# Patient Record
Sex: MALE | Race: WHITE | NOT HISPANIC OR LATINO | Employment: FULL TIME | URBAN - METROPOLITAN AREA
[De-identification: names, ages, dates, MRNs, and addresses within clinical notes are randomized per-mention and may not be internally consistent; named-entity substitution may affect disease eponyms.]

---

## 2020-02-24 ENCOUNTER — OFFICE VISIT (OUTPATIENT)
Dept: CARDIOLOGY | Facility: CLINIC | Age: 50
End: 2020-02-24

## 2020-02-24 VITALS
WEIGHT: 194.6 LBS | DIASTOLIC BLOOD PRESSURE: 78 MMHG | SYSTOLIC BLOOD PRESSURE: 126 MMHG | HEART RATE: 65 BPM | HEIGHT: 60 IN | BODY MASS INDEX: 38.21 KG/M2

## 2020-02-24 DIAGNOSIS — R07.89 CHEST DISCOMFORT: ICD-10-CM

## 2020-02-24 DIAGNOSIS — R06.02 SOB (SHORTNESS OF BREATH): ICD-10-CM

## 2020-02-24 DIAGNOSIS — I50.9 CONGESTIVE HEART FAILURE, UNSPECIFIED HF CHRONICITY, UNSPECIFIED HEART FAILURE TYPE (HCC): Primary | ICD-10-CM

## 2020-02-24 PROCEDURE — 99204 OFFICE O/P NEW MOD 45 MIN: CPT | Performed by: INTERNAL MEDICINE

## 2020-02-24 RX ORDER — LISINOPRIL 10 MG/1
10 TABLET ORAL DAILY
COMMUNITY
Start: 2020-02-02

## 2020-02-24 RX ORDER — LOVASTATIN 20 MG/1
20 TABLET ORAL NIGHTLY
COMMUNITY

## 2020-02-24 RX ORDER — METHADONE HYDROCHLORIDE 10 MG/5ML
130 SOLUTION ORAL DAILY
COMMUNITY

## 2020-02-24 RX ORDER — FUROSEMIDE 20 MG/1
20 TABLET ORAL DAILY
COMMUNITY
Start: 2020-02-21

## 2020-02-24 RX ORDER — IBUPROFEN 200 MG
800 TABLET ORAL EVERY 6 HOURS PRN
COMMUNITY

## 2020-03-09 ENCOUNTER — OUTSIDE FACILITY SERVICE (OUTPATIENT)
Dept: CARDIOLOGY | Facility: CLINIC | Age: 50
End: 2020-03-09

## 2020-03-09 DIAGNOSIS — R07.2 PRECORDIAL PAIN: ICD-10-CM

## 2020-03-09 DIAGNOSIS — R06.02 SHORTNESS OF BREATH: ICD-10-CM

## 2020-03-09 DIAGNOSIS — R94.39 ABNORMAL STRESS TEST: ICD-10-CM

## 2020-03-09 DIAGNOSIS — I20.9 ANGINA PECTORIS (HCC): Primary | ICD-10-CM

## 2020-03-09 PROCEDURE — 78452 HT MUSCLE IMAGE SPECT MULT: CPT | Performed by: INTERNAL MEDICINE

## 2020-03-09 PROCEDURE — 93018 CV STRESS TEST I&R ONLY: CPT | Performed by: INTERNAL MEDICINE

## 2020-03-09 PROCEDURE — 93306 TTE W/DOPPLER COMPLETE: CPT | Performed by: INTERNAL MEDICINE

## 2020-03-10 PROBLEM — R06.02 SHORTNESS OF BREATH: Status: ACTIVE | Noted: 2020-03-10

## 2020-03-10 PROBLEM — I20.9 ANGINA PECTORIS (HCC): Status: ACTIVE | Noted: 2020-03-10

## 2020-03-10 PROBLEM — R07.2 PRECORDIAL PAIN: Status: ACTIVE | Noted: 2020-03-10

## 2020-03-10 PROBLEM — R94.39 ABNORMAL STRESS TEST: Status: ACTIVE | Noted: 2020-03-10

## 2020-03-17 ENCOUNTER — HOSPITAL ENCOUNTER (OUTPATIENT)
Facility: HOSPITAL | Age: 50
Setting detail: HOSPITAL OUTPATIENT SURGERY
Discharge: HOME | End: 2020-03-17
Attending: INTERNAL MEDICINE | Admitting: INTERNAL MEDICINE

## 2020-03-17 VITALS
SYSTOLIC BLOOD PRESSURE: 101 MMHG | DIASTOLIC BLOOD PRESSURE: 67 MMHG | OXYGEN SATURATION: 96 % | HEART RATE: 61 BPM | RESPIRATION RATE: 16 BRPM | TEMPERATURE: 98.2 F

## 2020-03-17 DIAGNOSIS — R07.2 PRECORDIAL PAIN: ICD-10-CM

## 2020-03-17 DIAGNOSIS — I20.9 ANGINA PECTORIS (HCC): ICD-10-CM

## 2020-03-17 DIAGNOSIS — R06.02 SHORTNESS OF BREATH: ICD-10-CM

## 2020-03-17 DIAGNOSIS — R94.39 ABNORMAL STRESS TEST: ICD-10-CM

## 2020-03-17 LAB
ANION GAP SERPL CALCULATED.3IONS-SCNC: 11 MMOL/L (ref 5–15)
APTT PPP: 25.5 SECONDS (ref 24–31)
BASOPHILS # BLD AUTO: 0.1 10*3/MM3 (ref 0–0.2)
BASOPHILS NFR BLD AUTO: 0.8 % (ref 0–1.5)
BUN BLD-MCNC: 15 MG/DL (ref 6–20)
BUN/CREAT SERPL: 17 (ref 7–25)
CALCIUM SPEC-SCNC: 9.5 MG/DL (ref 8.6–10.5)
CHLORIDE SERPL-SCNC: 103 MMOL/L (ref 98–107)
CO2 SERPL-SCNC: 28 MMOL/L (ref 22–29)
CREAT BLD-MCNC: 0.88 MG/DL (ref 0.76–1.27)
DEPRECATED RDW RBC AUTO: 44.2 FL (ref 37–54)
EOSINOPHIL # BLD AUTO: 0.2 10*3/MM3 (ref 0–0.4)
EOSINOPHIL NFR BLD AUTO: 3 % (ref 0.3–6.2)
ERYTHROCYTE [DISTWIDTH] IN BLOOD BY AUTOMATED COUNT: 13.5 % (ref 12.3–15.4)
GFR SERPL CREATININE-BSD FRML MDRD: 91 ML/MIN/1.73
GLUCOSE BLD-MCNC: 91 MG/DL (ref 65–99)
HCT VFR BLD AUTO: 39.5 % (ref 37.5–51)
HGB BLD-MCNC: 14.3 G/DL (ref 13–17.7)
INR PPP: 0.99 (ref 0.9–1.1)
LYMPHOCYTES # BLD AUTO: 2.1 10*3/MM3 (ref 0.7–3.1)
LYMPHOCYTES NFR BLD AUTO: 25.1 % (ref 19.6–45.3)
MCH RBC QN AUTO: 34.4 PG (ref 26.6–33)
MCHC RBC AUTO-ENTMCNC: 36.2 G/DL (ref 31.5–35.7)
MCV RBC AUTO: 95.1 FL (ref 79–97)
MONOCYTES # BLD AUTO: 0.6 10*3/MM3 (ref 0.1–0.9)
MONOCYTES NFR BLD AUTO: 6.8 % (ref 5–12)
NEUTROPHILS # BLD AUTO: 5.3 10*3/MM3 (ref 1.7–7)
NEUTROPHILS NFR BLD AUTO: 64.3 % (ref 42.7–76)
NRBC BLD AUTO-RTO: 0 /100 WBC (ref 0–0.2)
PLATELET # BLD AUTO: 147 10*3/MM3 (ref 140–450)
PMV BLD AUTO: 8.9 FL (ref 6–12)
POTASSIUM BLD-SCNC: 4.2 MMOL/L (ref 3.5–5.2)
PROTHROMBIN TIME: 10.4 SECONDS (ref 9.6–11.7)
RBC # BLD AUTO: 4.15 10*6/MM3 (ref 4.14–5.8)
SODIUM BLD-SCNC: 142 MMOL/L (ref 136–145)
WBC NRBC COR # BLD: 8.2 10*3/MM3 (ref 3.4–10.8)

## 2020-03-17 PROCEDURE — C1769 GUIDE WIRE: HCPCS | Performed by: INTERNAL MEDICINE

## 2020-03-17 PROCEDURE — 25010000002 MIDAZOLAM PER 1 MG: Performed by: INTERNAL MEDICINE

## 2020-03-17 PROCEDURE — 85610 PROTHROMBIN TIME: CPT | Performed by: INTERNAL MEDICINE

## 2020-03-17 PROCEDURE — C1894 INTRO/SHEATH, NON-LASER: HCPCS | Performed by: INTERNAL MEDICINE

## 2020-03-17 PROCEDURE — 80048 BASIC METABOLIC PNL TOTAL CA: CPT | Performed by: INTERNAL MEDICINE

## 2020-03-17 PROCEDURE — 93458 L HRT ARTERY/VENTRICLE ANGIO: CPT | Performed by: INTERNAL MEDICINE

## 2020-03-17 PROCEDURE — 85025 COMPLETE CBC W/AUTO DIFF WBC: CPT | Performed by: INTERNAL MEDICINE

## 2020-03-17 PROCEDURE — 93005 ELECTROCARDIOGRAM TRACING: CPT | Performed by: INTERNAL MEDICINE

## 2020-03-17 PROCEDURE — 25010000002 FENTANYL CITRATE (PF) 100 MCG/2ML SOLUTION: Performed by: INTERNAL MEDICINE

## 2020-03-17 PROCEDURE — 0 IOPAMIDOL PER 1 ML: Performed by: INTERNAL MEDICINE

## 2020-03-17 PROCEDURE — 85730 THROMBOPLASTIN TIME PARTIAL: CPT | Performed by: INTERNAL MEDICINE

## 2020-03-17 RX ORDER — ATROPINE SULFATE 1 MG/ML
.5-1 INJECTION, SOLUTION INTRAMUSCULAR; INTRAVENOUS; SUBCUTANEOUS
Status: DISCONTINUED | OUTPATIENT
Start: 2020-03-17 | End: 2020-03-18 | Stop reason: HOSPADM

## 2020-03-17 RX ORDER — DIPHENHYDRAMINE HCL 25 MG
25 TABLET ORAL EVERY 6 HOURS PRN
Status: DISCONTINUED | OUTPATIENT
Start: 2020-03-17 | End: 2020-03-18 | Stop reason: HOSPADM

## 2020-03-17 RX ORDER — MIDAZOLAM HYDROCHLORIDE 1 MG/ML
INJECTION INTRAMUSCULAR; INTRAVENOUS AS NEEDED
Status: DISCONTINUED | OUTPATIENT
Start: 2020-03-17 | End: 2020-03-17 | Stop reason: HOSPADM

## 2020-03-17 RX ORDER — LIDOCAINE HYDROCHLORIDE 20 MG/ML
INJECTION, SOLUTION INFILTRATION; PERINEURAL AS NEEDED
Status: DISCONTINUED | OUTPATIENT
Start: 2020-03-17 | End: 2020-03-17 | Stop reason: HOSPADM

## 2020-03-17 RX ORDER — FENTANYL CITRATE 50 UG/ML
INJECTION, SOLUTION INTRAMUSCULAR; INTRAVENOUS AS NEEDED
Status: DISCONTINUED | OUTPATIENT
Start: 2020-03-17 | End: 2020-03-17 | Stop reason: HOSPADM

## 2020-03-17 RX ORDER — SODIUM CHLORIDE 9 MG/ML
250 INJECTION, SOLUTION INTRAVENOUS ONCE AS NEEDED
Status: DISCONTINUED | OUTPATIENT
Start: 2020-03-17 | End: 2020-03-18 | Stop reason: HOSPADM

## 2020-04-03 PROCEDURE — 93010 ELECTROCARDIOGRAM REPORT: CPT | Performed by: INTERNAL MEDICINE

## 2021-06-29 ENCOUNTER — HOSPITAL ENCOUNTER (EMERGENCY)
Facility: HOSPITAL | Age: 51
Discharge: HOME | End: 2021-06-29
Admitting: EMERGENCY MEDICINE

## 2021-06-29 ENCOUNTER — APPOINTMENT (OUTPATIENT)
Dept: GENERAL RADIOLOGY | Facility: HOSPITAL | Age: 51
End: 2021-06-29

## 2021-06-29 VITALS
HEART RATE: 68 BPM | OXYGEN SATURATION: 96 % | RESPIRATION RATE: 15 BRPM | BODY MASS INDEX: 28.31 KG/M2 | TEMPERATURE: 97.5 F | WEIGHT: 209 LBS | DIASTOLIC BLOOD PRESSURE: 78 MMHG | HEIGHT: 72 IN | SYSTOLIC BLOOD PRESSURE: 127 MMHG

## 2021-06-29 DIAGNOSIS — T67.5XXA HEAT EXHAUSTION, INITIAL ENCOUNTER: Primary | ICD-10-CM

## 2021-06-29 LAB
ALBUMIN SERPL-MCNC: 4.5 G/DL (ref 3.5–5.2)
ALBUMIN/GLOB SERPL: 1.6 G/DL
ALP SERPL-CCNC: 108 U/L (ref 39–117)
ALT SERPL W P-5'-P-CCNC: 14 U/L (ref 1–41)
ANION GAP SERPL CALCULATED.3IONS-SCNC: 15 MMOL/L (ref 5–15)
AST SERPL-CCNC: 23 U/L (ref 1–40)
BASOPHILS # BLD AUTO: 0.1 10*3/MM3 (ref 0–0.2)
BASOPHILS NFR BLD AUTO: 1 % (ref 0–1.5)
BILIRUB SERPL-MCNC: 0.4 MG/DL (ref 0–1.2)
BUN SERPL-MCNC: 15 MG/DL (ref 6–20)
BUN/CREAT SERPL: 15.5 (ref 7–25)
CALCIUM SPEC-SCNC: 9.5 MG/DL (ref 8.6–10.5)
CHLORIDE SERPL-SCNC: 102 MMOL/L (ref 98–107)
CK MB SERPL-CCNC: 2.49 NG/ML
CK SERPL-CCNC: 110 U/L (ref 20–200)
CO2 SERPL-SCNC: 24 MMOL/L (ref 22–29)
CREAT SERPL-MCNC: 0.97 MG/DL (ref 0.76–1.27)
DEPRECATED RDW RBC AUTO: 44.6 FL (ref 37–54)
EOSINOPHIL # BLD AUTO: 0.2 10*3/MM3 (ref 0–0.4)
EOSINOPHIL NFR BLD AUTO: 2.7 % (ref 0.3–6.2)
ERYTHROCYTE [DISTWIDTH] IN BLOOD BY AUTOMATED COUNT: 13.8 % (ref 12.3–15.4)
GFR SERPL CREATININE-BSD FRML MDRD: 81 ML/MIN/1.73
GLOBULIN UR ELPH-MCNC: 2.8 GM/DL
GLUCOSE SERPL-MCNC: 119 MG/DL (ref 65–99)
HCT VFR BLD AUTO: 41.1 % (ref 37.5–51)
HGB BLD-MCNC: 14.6 G/DL (ref 13–17.7)
HOLD SPECIMEN: NORMAL
LYMPHOCYTES # BLD AUTO: 1.5 10*3/MM3 (ref 0.7–3.1)
LYMPHOCYTES NFR BLD AUTO: 22.6 % (ref 19.6–45.3)
MCH RBC QN AUTO: 33.5 PG (ref 26.6–33)
MCHC RBC AUTO-ENTMCNC: 35.5 G/DL (ref 31.5–35.7)
MCV RBC AUTO: 94.4 FL (ref 79–97)
MONOCYTES # BLD AUTO: 0.4 10*3/MM3 (ref 0.1–0.9)
MONOCYTES NFR BLD AUTO: 6.3 % (ref 5–12)
NEUTROPHILS NFR BLD AUTO: 4.4 10*3/MM3 (ref 1.7–7)
NEUTROPHILS NFR BLD AUTO: 67.4 % (ref 42.7–76)
NRBC BLD AUTO-RTO: 0 /100 WBC (ref 0–0.2)
PLATELET # BLD AUTO: 170 10*3/MM3 (ref 140–450)
PMV BLD AUTO: 9.3 FL (ref 6–12)
POTASSIUM SERPL-SCNC: 4.4 MMOL/L (ref 3.5–5.2)
PROT SERPL-MCNC: 7.3 G/DL (ref 6–8.5)
RBC # BLD AUTO: 4.35 10*6/MM3 (ref 4.14–5.8)
SODIUM SERPL-SCNC: 141 MMOL/L (ref 136–145)
TROPONIN T SERPL-MCNC: <0.01 NG/ML (ref 0–0.03)
WBC # BLD AUTO: 6.6 10*3/MM3 (ref 3.4–10.8)

## 2021-06-29 PROCEDURE — 85025 COMPLETE CBC W/AUTO DIFF WBC: CPT | Performed by: NURSE PRACTITIONER

## 2021-06-29 PROCEDURE — 71045 X-RAY EXAM CHEST 1 VIEW: CPT

## 2021-06-29 PROCEDURE — 99283 EMERGENCY DEPT VISIT LOW MDM: CPT

## 2021-06-29 PROCEDURE — 80053 COMPREHEN METABOLIC PANEL: CPT | Performed by: NURSE PRACTITIONER

## 2021-06-29 PROCEDURE — 82553 CREATINE MB FRACTION: CPT | Performed by: NURSE PRACTITIONER

## 2021-06-29 PROCEDURE — 93005 ELECTROCARDIOGRAM TRACING: CPT

## 2021-06-29 PROCEDURE — 84484 ASSAY OF TROPONIN QUANT: CPT | Performed by: NURSE PRACTITIONER

## 2021-06-29 PROCEDURE — 82550 ASSAY OF CK (CPK): CPT | Performed by: NURSE PRACTITIONER

## 2021-06-29 RX ORDER — SODIUM CHLORIDE 0.9 % (FLUSH) 0.9 %
10 SYRINGE (ML) INJECTION AS NEEDED
Status: DISCONTINUED | OUTPATIENT
Start: 2021-06-29 | End: 2021-06-29 | Stop reason: HOSPADM

## 2021-06-29 RX ADMIN — SODIUM CHLORIDE 1000 ML: 9 INJECTION, SOLUTION INTRAVENOUS at 18:09

## 2021-07-01 LAB — QT INTERVAL: 488 MS

## 2025-05-29 ENCOUNTER — APPOINTMENT (OUTPATIENT)
Dept: MRI IMAGING | Facility: HOSPITAL | Age: 55
End: 2025-05-29
Payer: OTHER GOVERNMENT

## 2025-05-29 ENCOUNTER — HOSPITAL ENCOUNTER (INPATIENT)
Facility: HOSPITAL | Age: 55
LOS: 4 days | Discharge: NURSING, REHAB OR LONG-TERM CARE | End: 2025-06-02
Attending: STUDENT IN AN ORGANIZED HEALTH CARE EDUCATION/TRAINING PROGRAM | Admitting: STUDENT IN AN ORGANIZED HEALTH CARE EDUCATION/TRAINING PROGRAM
Payer: MEDICAID

## 2025-05-29 ENCOUNTER — APPOINTMENT (OUTPATIENT)
Dept: CARDIOLOGY | Facility: HOSPITAL | Age: 55
End: 2025-05-29
Payer: MEDICAID

## 2025-05-29 ENCOUNTER — APPOINTMENT (OUTPATIENT)
Dept: CT IMAGING | Facility: HOSPITAL | Age: 55
End: 2025-05-29
Payer: OTHER GOVERNMENT

## 2025-05-29 DIAGNOSIS — I63.411 CEREBROVASCULAR ACCIDENT (CVA) DUE TO EMBOLISM OF RIGHT MIDDLE CEREBRAL ARTERY: Primary | ICD-10-CM

## 2025-05-29 PROBLEM — I63.9 STROKE: Status: ACTIVE | Noted: 2025-05-29

## 2025-05-29 LAB
ALBUMIN SERPL-MCNC: 3.9 G/DL (ref 3.5–5.2)
ALP SERPL-CCNC: 108 U/L (ref 39–117)
ALT SERPL W P-5'-P-CCNC: 25 U/L (ref 1–41)
ANION GAP SERPL CALCULATED.3IONS-SCNC: 19.1 MMOL/L (ref 5–15)
AORTIC DIMENSIONLESS INDEX: 0.62 (DI)
AST SERPL-CCNC: 39 U/L (ref 1–40)
AV MEAN PRESS GRAD SYS DOP V1V2: 6 MMHG
AV VMAX SYS DOP: 163 CM/SEC
BH CV ECHO MEAS - ACS: 1.8 CM
BH CV ECHO MEAS - AO MAX PG: 10.6 MMHG
BH CV ECHO MEAS - AO V2 VTI: 30.1 CM
BH CV ECHO MEAS - AVA(I,D): 2.6 CM2
BH CV ECHO MEAS - EDV(CUBED): 110.6 ML
BH CV ECHO MEAS - EDV(MOD-SP4): 94.8 ML
BH CV ECHO MEAS - EF(MOD-SP4): 42.1 %
BH CV ECHO MEAS - ESV(CUBED): 50.7 ML
BH CV ECHO MEAS - ESV(MOD-SP4): 54.9 ML
BH CV ECHO MEAS - FS: 22.9 %
BH CV ECHO MEAS - IVS/LVPW: 1 CM
BH CV ECHO MEAS - IVSD: 1.1 CM
BH CV ECHO MEAS - LA DIMENSION: 3.1 CM
BH CV ECHO MEAS - LAT PEAK E' VEL: 8.4 CM/SEC
BH CV ECHO MEAS - LV MASS(C)D: 194 GRAMS
BH CV ECHO MEAS - LV MAX PG: 2.8 MMHG
BH CV ECHO MEAS - LV MEAN PG: 2 MMHG
BH CV ECHO MEAS - LV V1 MAX: 83.4 CM/SEC
BH CV ECHO MEAS - LV V1 VTI: 18.7 CM
BH CV ECHO MEAS - LVIDD: 4.8 CM
BH CV ECHO MEAS - LVIDS: 3.7 CM
BH CV ECHO MEAS - LVOT AREA: 4.2 CM2
BH CV ECHO MEAS - LVOT DIAM: 2.3 CM
BH CV ECHO MEAS - LVPWD: 1.1 CM
BH CV ECHO MEAS - MED PEAK E' VEL: 5.7 CM/SEC
BH CV ECHO MEAS - MV A MAX VEL: 116 CM/SEC
BH CV ECHO MEAS - MV DEC SLOPE: 392.5 CM/SEC2
BH CV ECHO MEAS - MV DEC TIME: 0.29 SEC
BH CV ECHO MEAS - MV E MAX VEL: 87.5 CM/SEC
BH CV ECHO MEAS - MV E/A: 0.75
BH CV ECHO MEAS - MV MAX PG: 5.3 MMHG
BH CV ECHO MEAS - MV MEAN PG: 3 MMHG
BH CV ECHO MEAS - MV P1/2T: 74.5 MSEC
BH CV ECHO MEAS - MV V2 VTI: 20.2 CM
BH CV ECHO MEAS - MVA(P1/2T): 3 CM2
BH CV ECHO MEAS - MVA(VTI): 3.8 CM2
BH CV ECHO MEAS - PA V2 MAX: 88.6 CM/SEC
BH CV ECHO MEAS - QP/QS: 0.57
BH CV ECHO MEAS - RV MAX PG: 1.36 MMHG
BH CV ECHO MEAS - RV V1 MAX: 58.3 CM/SEC
BH CV ECHO MEAS - RV V1 VTI: 14.2 CM
BH CV ECHO MEAS - RVDD: 3 CM
BH CV ECHO MEAS - RVOT DIAM: 2 CM
BH CV ECHO MEAS - SV(LVOT): 77.7 ML
BH CV ECHO MEAS - SV(MOD-SP4): 39.9 ML
BH CV ECHO MEAS - SV(RVOT): 44.6 ML
BH CV ECHO MEAS - TAPSE (>1.6): 2.9 CM
BH CV ECHO MEASUREMENTS AVERAGE E/E' RATIO: 12.41
BH CV XLRA - TDI S': 12.7 CM/SEC
BH CV XLRA MEAS LEFT CAROTID BULB EDV: -36.4 CM/SEC
BH CV XLRA MEAS LEFT CAROTID BULB PSV: -80.6 CM/SEC
BH CV XLRA MEAS LEFT DIST CCA EDV: -29.8 CM/SEC
BH CV XLRA MEAS LEFT DIST CCA PSV: -78.3 CM/SEC
BH CV XLRA MEAS LEFT DIST ICA EDV: -46.9 CM/SEC
BH CV XLRA MEAS LEFT DIST ICA PSV: -93.9 CM/SEC
BH CV XLRA MEAS LEFT ICA/CCA RATIO: 0.79
BH CV XLRA MEAS LEFT PROX CCA EDV: 37.8 CM/SEC
BH CV XLRA MEAS LEFT PROX CCA PSV: 119 CM/SEC
BH CV XLRA MEAS LEFT PROX ECA PSV: -122 CM/SEC
BH CV XLRA MEAS LEFT PROX ICA EDV: -30.1 CM/SEC
BH CV XLRA MEAS LEFT PROX ICA PSV: -79.2 CM/SEC
BH CV XLRA MEAS LEFT PROX SCLA PSV: 163 CM/SEC
BH CV XLRA MEAS LEFT VERTEBRAL A EDV: -21.7 CM/SEC
BH CV XLRA MEAS LEFT VERTEBRAL A PSV: -63.1 CM/SEC
BH CV XLRA MEAS RIGHT CAROTID BULB EDV: -18 CM/SEC
BH CV XLRA MEAS RIGHT CAROTID BULB PSV: -60.9 CM/SEC
BH CV XLRA MEAS RIGHT DIST CCA EDV: 20.5 CM/SEC
BH CV XLRA MEAS RIGHT DIST CCA PSV: 67.1 CM/SEC
BH CV XLRA MEAS RIGHT DIST ICA EDV: -25.6 CM/SEC
BH CV XLRA MEAS RIGHT DIST ICA PSV: -61.3 CM/SEC
BH CV XLRA MEAS RIGHT ICA/CCA RATIO: 0.7
BH CV XLRA MEAS RIGHT PROX CCA EDV: 22.4 CM/SEC
BH CV XLRA MEAS RIGHT PROX CCA PSV: 88.2 CM/SEC
BH CV XLRA MEAS RIGHT PROX ECA PSV: -168 CM/SEC
BH CV XLRA MEAS RIGHT PROX ICA EDV: -19.6 CM/SEC
BH CV XLRA MEAS RIGHT PROX ICA PSV: -51.1 CM/SEC
BH CV XLRA MEAS RIGHT PROX SCLA PSV: 177 CM/SEC
BH CV XLRA MEAS RIGHT VERTEBRAL A EDV: 18.1 CM/SEC
BH CV XLRA MEAS RIGHT VERTEBRAL A PSV: 79 CM/SEC
BILIRUB CONJ SERPL-MCNC: 0.3 MG/DL (ref 0–0.3)
BILIRUB INDIRECT SERPL-MCNC: 0.6 MG/DL
BILIRUB SERPL-MCNC: 0.9 MG/DL (ref 0–1.2)
BUN SERPL-MCNC: 6.1 MG/DL (ref 6–20)
BUN/CREAT SERPL: 9.4 (ref 7–25)
CALCIUM SPEC-SCNC: 8.9 MG/DL (ref 8.6–10.5)
CHLORIDE SERPL-SCNC: 99 MMOL/L (ref 98–107)
CHOLEST SERPL-MCNC: 194 MG/DL (ref 0–200)
CO2 SERPL-SCNC: 23.9 MMOL/L (ref 22–29)
CREAT SERPL-MCNC: 0.65 MG/DL (ref 0.76–1.27)
EGFRCR SERPLBLD CKD-EPI 2021: 110.6 ML/MIN/1.73
ERYTHROCYTE [SEDIMENTATION RATE] IN BLOOD: <1 MM/HR (ref 0–20)
GEN 5 1HR TROPONIN T REFLEX: 168 NG/L
GLUCOSE BLDC GLUCOMTR-MCNC: 125 MG/DL (ref 70–105)
GLUCOSE BLDC GLUCOMTR-MCNC: 130 MG/DL (ref 70–105)
GLUCOSE BLDC GLUCOMTR-MCNC: 142 MG/DL (ref 70–105)
GLUCOSE SERPL-MCNC: 93 MG/DL (ref 65–99)
HBA1C MFR BLD: 5.29 % (ref 4.8–5.6)
HDLC SERPL-MCNC: 53 MG/DL (ref 40–60)
HOLD SPECIMEN: NORMAL
LDLC SERPL CALC-MCNC: 122 MG/DL (ref 0–100)
LDLC/HDLC SERPL: 2.26 {RATIO}
POTASSIUM SERPL-SCNC: 3.4 MMOL/L (ref 3.5–5.2)
POTASSIUM SERPL-SCNC: 3.6 MMOL/L (ref 3.5–5.2)
PROT SERPL-MCNC: 6 G/DL (ref 6–8.5)
SINUS: 4.1 CM
SODIUM SERPL-SCNC: 142 MMOL/L (ref 136–145)
STJ: 2.8 CM
TRIGL SERPL-MCNC: 107 MG/DL (ref 0–150)
TROPONIN T % DELTA: 3
TROPONIN T NUMERIC DELTA: 5 NG/L
TROPONIN T SERPL HS-MCNC: 163 NG/L
TSH SERPL DL<=0.05 MIU/L-ACNC: 3.08 UIU/ML (ref 0.27–4.2)
VIT B12 BLD-MCNC: <150 PG/ML (ref 211–946)
VLDLC SERPL-MCNC: 19 MG/DL (ref 5–40)

## 2025-05-29 PROCEDURE — 82948 REAGENT STRIP/BLOOD GLUCOSE: CPT

## 2025-05-29 PROCEDURE — 93306 TTE W/DOPPLER COMPLETE: CPT

## 2025-05-29 PROCEDURE — 84484 ASSAY OF TROPONIN QUANT: CPT

## 2025-05-29 PROCEDURE — 80048 BASIC METABOLIC PNL TOTAL CA: CPT

## 2025-05-29 PROCEDURE — 93880 EXTRACRANIAL BILAT STUDY: CPT

## 2025-05-29 PROCEDURE — 70551 MRI BRAIN STEM W/O DYE: CPT

## 2025-05-29 PROCEDURE — 25010000002 FOLIC ACID 5 MG/ML SOLUTION 10 ML VIAL

## 2025-05-29 PROCEDURE — 92523 SPEECH SOUND LANG COMPREHEN: CPT

## 2025-05-29 PROCEDURE — 83036 HEMOGLOBIN GLYCOSYLATED A1C: CPT

## 2025-05-29 PROCEDURE — 97166 OT EVAL MOD COMPLEX 45 MIN: CPT

## 2025-05-29 PROCEDURE — 93880 EXTRACRANIAL BILAT STUDY: CPT | Performed by: SURGERY

## 2025-05-29 PROCEDURE — 80076 HEPATIC FUNCTION PANEL: CPT | Performed by: NURSE PRACTITIONER

## 2025-05-29 PROCEDURE — 25010000002 HYDRALAZINE PER 20 MG

## 2025-05-29 PROCEDURE — 97162 PT EVAL MOD COMPLEX 30 MIN: CPT

## 2025-05-29 PROCEDURE — 84443 ASSAY THYROID STIM HORMONE: CPT

## 2025-05-29 PROCEDURE — 25510000001 IOPAMIDOL PER 1 ML

## 2025-05-29 PROCEDURE — 82607 VITAMIN B-12: CPT | Performed by: NURSE PRACTITIONER

## 2025-05-29 PROCEDURE — 80061 LIPID PANEL: CPT

## 2025-05-29 PROCEDURE — 70553 MRI BRAIN STEM W/O & W/DYE: CPT

## 2025-05-29 PROCEDURE — 25010000002 THIAMINE PER 100 MG

## 2025-05-29 PROCEDURE — 93306 TTE W/DOPPLER COMPLETE: CPT | Performed by: INTERNAL MEDICINE

## 2025-05-29 PROCEDURE — 85652 RBC SED RATE AUTOMATED: CPT

## 2025-05-29 PROCEDURE — 25010000002 DIAZEPAM PER 5 MG

## 2025-05-29 PROCEDURE — 99222 1ST HOSP IP/OBS MODERATE 55: CPT | Performed by: INTERNAL MEDICINE

## 2025-05-29 PROCEDURE — 71260 CT THORAX DX C+: CPT

## 2025-05-29 PROCEDURE — 99222 1ST HOSP IP/OBS MODERATE 55: CPT | Performed by: NURSE PRACTITIONER

## 2025-05-29 PROCEDURE — 84132 ASSAY OF SERUM POTASSIUM: CPT

## 2025-05-29 PROCEDURE — 74177 CT ABD & PELVIS W/CONTRAST: CPT

## 2025-05-29 RX ORDER — POTASSIUM CHLORIDE 1500 MG/1
40 TABLET, EXTENDED RELEASE ORAL EVERY 4 HOURS
Status: COMPLETED | OUTPATIENT
Start: 2025-05-29 | End: 2025-05-29

## 2025-05-29 RX ORDER — BISACODYL 5 MG/1
5 TABLET, DELAYED RELEASE ORAL DAILY PRN
Status: DISCONTINUED | OUTPATIENT
Start: 2025-05-29 | End: 2025-06-02 | Stop reason: HOSPADM

## 2025-05-29 RX ORDER — ATORVASTATIN CALCIUM 40 MG/1
80 TABLET, FILM COATED ORAL NIGHTLY
Status: DISCONTINUED | OUTPATIENT
Start: 2025-05-29 | End: 2025-06-02 | Stop reason: HOSPADM

## 2025-05-29 RX ORDER — DIAZEPAM 5 MG/1
10 TABLET ORAL
Status: DISCONTINUED | OUTPATIENT
Start: 2025-05-29 | End: 2025-06-02 | Stop reason: HOSPADM

## 2025-05-29 RX ORDER — NICOTINE 21 MG/24HR
1 PATCH, TRANSDERMAL 24 HOURS TRANSDERMAL
Status: DISCONTINUED | OUTPATIENT
Start: 2025-05-29 | End: 2025-06-02 | Stop reason: HOSPADM

## 2025-05-29 RX ORDER — ONDANSETRON 2 MG/ML
4 INJECTION INTRAMUSCULAR; INTRAVENOUS EVERY 6 HOURS PRN
Status: DISCONTINUED | OUTPATIENT
Start: 2025-05-29 | End: 2025-06-02 | Stop reason: HOSPADM

## 2025-05-29 RX ORDER — ACETAMINOPHEN 160 MG/5ML
650 SOLUTION ORAL EVERY 4 HOURS PRN
Status: DISCONTINUED | OUTPATIENT
Start: 2025-05-29 | End: 2025-06-02 | Stop reason: HOSPADM

## 2025-05-29 RX ORDER — POLYETHYLENE GLYCOL 3350 17 G/17G
17 POWDER, FOR SOLUTION ORAL DAILY PRN
Status: DISCONTINUED | OUTPATIENT
Start: 2025-05-29 | End: 2025-06-02 | Stop reason: HOSPADM

## 2025-05-29 RX ORDER — IOPAMIDOL 755 MG/ML
100 INJECTION, SOLUTION INTRAVASCULAR
Status: COMPLETED | OUTPATIENT
Start: 2025-05-29 | End: 2025-05-29

## 2025-05-29 RX ORDER — ASPIRIN 300 MG/1
300 SUPPOSITORY RECTAL DAILY
Status: DISCONTINUED | OUTPATIENT
Start: 2025-05-29 | End: 2025-06-02 | Stop reason: HOSPADM

## 2025-05-29 RX ORDER — HYDRALAZINE HYDROCHLORIDE 20 MG/ML
10 INJECTION INTRAMUSCULAR; INTRAVENOUS EVERY 4 HOURS PRN
Status: DISCONTINUED | OUTPATIENT
Start: 2025-05-29 | End: 2025-06-02 | Stop reason: HOSPADM

## 2025-05-29 RX ORDER — CLOPIDOGREL BISULFATE 75 MG/1
75 TABLET ORAL DAILY
Status: DISCONTINUED | OUTPATIENT
Start: 2025-05-29 | End: 2025-06-02 | Stop reason: HOSPADM

## 2025-05-29 RX ORDER — THIAMINE HYDROCHLORIDE 100 MG/ML
200 INJECTION, SOLUTION INTRAMUSCULAR; INTRAVENOUS EVERY 8 HOURS SCHEDULED
Status: DISCONTINUED | OUTPATIENT
Start: 2025-05-29 | End: 2025-06-02 | Stop reason: HOSPADM

## 2025-05-29 RX ORDER — AMOXICILLIN 250 MG
2 CAPSULE ORAL 2 TIMES DAILY PRN
Status: DISCONTINUED | OUTPATIENT
Start: 2025-05-29 | End: 2025-06-02 | Stop reason: HOSPADM

## 2025-05-29 RX ORDER — NITROGLYCERIN 0.4 MG/1
0.4 TABLET SUBLINGUAL
Status: DISCONTINUED | OUTPATIENT
Start: 2025-05-29 | End: 2025-06-02 | Stop reason: HOSPADM

## 2025-05-29 RX ORDER — ASPIRIN 325 MG
325 TABLET ORAL DAILY
Status: DISCONTINUED | OUTPATIENT
Start: 2025-05-29 | End: 2025-06-02 | Stop reason: HOSPADM

## 2025-05-29 RX ORDER — AMLODIPINE BESYLATE 5 MG/1
5 TABLET ORAL
Status: DISCONTINUED | OUTPATIENT
Start: 2025-05-30 | End: 2025-06-02 | Stop reason: HOSPADM

## 2025-05-29 RX ORDER — DIAZEPAM 10 MG/2ML
10 INJECTION, SOLUTION INTRAMUSCULAR; INTRAVENOUS
Status: DISCONTINUED | OUTPATIENT
Start: 2025-05-29 | End: 2025-06-02 | Stop reason: HOSPADM

## 2025-05-29 RX ORDER — BISACODYL 10 MG
10 SUPPOSITORY, RECTAL RECTAL DAILY PRN
Status: DISCONTINUED | OUTPATIENT
Start: 2025-05-29 | End: 2025-06-02 | Stop reason: HOSPADM

## 2025-05-29 RX ORDER — DIAZEPAM 10 MG/2ML
20 INJECTION, SOLUTION INTRAMUSCULAR; INTRAVENOUS
Status: DISCONTINUED | OUTPATIENT
Start: 2025-05-29 | End: 2025-06-02 | Stop reason: HOSPADM

## 2025-05-29 RX ORDER — ACETAMINOPHEN 650 MG/1
650 SUPPOSITORY RECTAL EVERY 4 HOURS PRN
Status: DISCONTINUED | OUTPATIENT
Start: 2025-05-29 | End: 2025-06-02 | Stop reason: HOSPADM

## 2025-05-29 RX ORDER — DIAZEPAM 10 MG/2ML
15 INJECTION, SOLUTION INTRAMUSCULAR; INTRAVENOUS
Status: DISCONTINUED | OUTPATIENT
Start: 2025-05-29 | End: 2025-06-02 | Stop reason: HOSPADM

## 2025-05-29 RX ORDER — ACETAMINOPHEN 325 MG/1
650 TABLET ORAL EVERY 4 HOURS PRN
Status: DISCONTINUED | OUTPATIENT
Start: 2025-05-29 | End: 2025-06-02 | Stop reason: HOSPADM

## 2025-05-29 RX ORDER — DIAZEPAM 5 MG/1
20 TABLET ORAL
Status: DISCONTINUED | OUTPATIENT
Start: 2025-05-29 | End: 2025-06-02 | Stop reason: HOSPADM

## 2025-05-29 RX ORDER — DIAZEPAM 5 MG/1
15 TABLET ORAL
Status: DISCONTINUED | OUTPATIENT
Start: 2025-05-29 | End: 2025-06-02 | Stop reason: HOSPADM

## 2025-05-29 RX ADMIN — ACETAMINOPHEN 650 MG: 325 TABLET, FILM COATED ORAL at 02:02

## 2025-05-29 RX ADMIN — ACETAMINOPHEN 650 MG: 325 TABLET, FILM COATED ORAL at 14:10

## 2025-05-29 RX ADMIN — DIAZEPAM 10 MG: 5 TABLET ORAL at 20:24

## 2025-05-29 RX ADMIN — IOPAMIDOL 100 ML: 755 INJECTION, SOLUTION INTRAVENOUS at 13:08

## 2025-05-29 RX ADMIN — THIAMINE HYDROCHLORIDE 200 MG: 100 INJECTION, SOLUTION INTRAMUSCULAR; INTRAVENOUS at 13:49

## 2025-05-29 RX ADMIN — DIAZEPAM 10 MG: 10 INJECTION, SOLUTION INTRAMUSCULAR; INTRAVENOUS at 02:03

## 2025-05-29 RX ADMIN — DIAZEPAM 15 MG: 5 TABLET ORAL at 16:44

## 2025-05-29 RX ADMIN — DIAZEPAM 10 MG: 5 TABLET ORAL at 10:21

## 2025-05-29 RX ADMIN — THIAMINE HYDROCHLORIDE 200 MG: 100 INJECTION, SOLUTION INTRAMUSCULAR; INTRAVENOUS at 07:17

## 2025-05-29 RX ADMIN — ASPIRIN 325 MG ORAL TABLET 325 MG: 325 PILL ORAL at 08:20

## 2025-05-29 RX ADMIN — ATORVASTATIN CALCIUM 80 MG: 40 TABLET, FILM COATED ORAL at 20:04

## 2025-05-29 RX ADMIN — THIAMINE HYDROCHLORIDE 200 MG: 100 INJECTION, SOLUTION INTRAMUSCULAR; INTRAVENOUS at 22:27

## 2025-05-29 RX ADMIN — NICOTINE 1 PATCH: 21 PATCH TRANSDERMAL at 07:18

## 2025-05-29 RX ADMIN — POTASSIUM CHLORIDE 40 MEQ: 1500 TABLET, EXTENDED RELEASE ORAL at 08:23

## 2025-05-29 RX ADMIN — HYDRALAZINE HYDROCHLORIDE 10 MG: 20 INJECTION INTRAMUSCULAR; INTRAVENOUS at 01:33

## 2025-05-29 RX ADMIN — POTASSIUM CHLORIDE 40 MEQ: 1500 TABLET, EXTENDED RELEASE ORAL at 04:13

## 2025-05-29 RX ADMIN — Medication 5 MG: at 22:43

## 2025-05-29 RX ADMIN — DIAZEPAM 10 MG: 5 TABLET ORAL at 14:10

## 2025-05-29 RX ADMIN — POTASSIUM CHLORIDE 40 MEQ: 1500 TABLET, EXTENDED RELEASE ORAL at 15:16

## 2025-05-29 RX ADMIN — POTASSIUM CHLORIDE 40 MEQ: 1500 TABLET, EXTENDED RELEASE ORAL at 20:04

## 2025-05-29 RX ADMIN — Medication 5 MG: at 04:13

## 2025-05-29 RX ADMIN — CLOPIDOGREL BISULFATE 75 MG: 75 TABLET, FILM COATED ORAL at 10:21

## 2025-05-29 RX ADMIN — HYDRALAZINE HYDROCHLORIDE 10 MG: 20 INJECTION INTRAMUSCULAR; INTRAVENOUS at 08:23

## 2025-05-29 RX ADMIN — DIAZEPAM 15 MG: 10 INJECTION, SOLUTION INTRAMUSCULAR; INTRAVENOUS at 22:27

## 2025-05-29 RX ADMIN — FOLIC ACID 1 MG: 5 INJECTION, SOLUTION INTRAMUSCULAR; INTRAVENOUS; SUBCUTANEOUS at 08:23

## 2025-05-29 NOTE — PLAN OF CARE
Problem: Adult Inpatient Plan of Care  Goal: Plan of Care Review  Outcome: Progressing  Flowsheets (Taken 5/29/2025 0357)  Plan of Care Reviewed With: patient  Goal: Patient-Specific Goal (Individualized)  Outcome: Progressing  Goal: Absence of Hospital-Acquired Illness or Injury  Outcome: Progressing  Intervention: Identify and Manage Fall Risk  Recent Flowsheet Documentation  Taken 5/29/2025 0356 by Elizabeth Braxton RN  Safety Promotion/Fall Prevention:   assistive device/personal items within reach   clutter free environment maintained   fall prevention program maintained   lighting adjusted   mobility aid in reach   nonskid shoes/slippers when out of bed   room organization consistent   safety round/check completed  Taken 5/29/2025 0200 by Elizabeth Braxton RN  Safety Promotion/Fall Prevention:   assistive device/personal items within reach   clutter free environment maintained   fall prevention program maintained   lighting adjusted   mobility aid in reach   nonskid shoes/slippers when out of bed   room organization consistent   safety round/check completed  Intervention: Prevent Skin Injury  Recent Flowsheet Documentation  Taken 5/29/2025 0300 by Elizabeth Braxton RN  Skin Protection: transparent dressing maintained  Intervention: Prevent and Manage VTE (Venous Thromboembolism) Risk  Recent Flowsheet Documentation  Taken 5/29/2025 0300 by Elizabeth Braxton RN  VTE Prevention/Management:   SCDs (sequential compression devices) off   patient refused intervention  Intervention: Prevent Infection  Recent Flowsheet Documentation  Taken 5/29/2025 0356 by Elizabeth Braxton RN  Infection Prevention:   equipment surfaces disinfected   hand hygiene promoted   personal protective equipment utilized   rest/sleep promoted   single patient room provided  Taken 5/29/2025 0200 by Elizabeth Braxton RN  Infection Prevention:   equipment surfaces disinfected   hand hygiene promoted   personal protective equipment utilized   rest/sleep  promoted   single patient room provided  Goal: Optimal Comfort and Wellbeing  Outcome: Progressing  Intervention: Monitor Pain and Promote Comfort  Recent Flowsheet Documentation  Taken 5/29/2025 0202 by Elizabeth Braxton RN  Pain Management Interventions: pain medication given  Intervention: Provide Person-Centered Care  Recent Flowsheet Documentation  Taken 5/29/2025 0300 by Elizabeth Braxton RN  Trust Relationship/Rapport: care explained  Goal: Readiness for Transition of Care  Outcome: Progressing  Intervention: Mutually Develop Transition Plan  Recent Flowsheet Documentation  Taken 5/29/2025 0147 by Elizabeth Braxton RN  Transportation Anticipated: family or friend will provide  Patient/Family Anticipated Services at Transition:   Patient/Family Anticipates Transition to: home with family  Taken 5/29/2025 0139 by Elizabeth Braxton RN  Equipment Currently Used at Home: none     Problem: Comorbidity Management  Goal: Blood Pressure in Desired Range  Outcome: Progressing  Intervention: Maintain Blood Pressure Management  Recent Flowsheet Documentation  Taken 5/29/2025 0356 by Elizabeth Braxton RN  Medication Review/Management: medications reviewed  Taken 5/29/2025 0200 by Elizabeth Braxton RN  Medication Review/Management: medications reviewed     Problem: Violence Risk or Actual  Goal: Anger and Impulse Control  Outcome: Progressing  Intervention: Minimize Safety Risk  Recent Flowsheet Documentation  Taken 5/29/2025 0356 by Elizabeth Braxton RN  Enhanced Safety Measures: bed alarm set  Taken 5/29/2025 0200 by Elizabeth Braxton RN  Enhanced Safety Measures: bed alarm set     Problem: Stroke, Ischemic (Includes Transient Ischemic Attack)  Goal: Optimal Coping  Outcome: Progressing  Goal: Effective Bowel Elimination  Outcome: Progressing  Goal: Optimal Cerebral Tissue Perfusion  Outcome: Progressing  Goal: Optimal Cognitive Function  Outcome: Progressing  Goal: Improved Communication Skills  Outcome: Progressing  Intervention:  Optimize Communication Skills  Recent Flowsheet Documentation  Taken 5/29/2025 0300 by Elizabeth Braxton RN  Communication Enhancement Strategies: call light answered in person  Goal: Optimal Functional Ability  Outcome: Progressing  Goal: Optimal Nutrition Intake  Outcome: Progressing  Goal: Effective Oxygenation and Ventilation  Outcome: Progressing  Goal: Improved Sensorimotor Function  Outcome: Progressing  Intervention: Optimize Sensory and Perceptual Ability  Recent Flowsheet Documentation  Taken 5/29/2025 0300 by Elizabeth Braxton RN  Pressure Reduction Techniques: frequent weight shift encouraged  Pressure Reduction Devices: pressure-redistributing mattress utilized  Goal: Safe and Effective Swallow  Outcome: Progressing  Goal: Effective Urinary Elimination  Outcome: Progressing     Problem: Alcohol Withdrawal  Goal: Alcohol Withdrawal Symptom Control  Outcome: Progressing  Goal: Optimal Neurologic Function  Outcome: Progressing  Goal: Readiness for Change Identified  Outcome: Progressing   Goal Outcome Evaluation:  Plan of Care Reviewed With: patient

## 2025-05-29 NOTE — CONSULTS
Diabetes Education    Patient Name:  Tyson Rosario  YOB: 1970  MRN: [id number]  Admit Date:  5/29/2025      Consult received per stroke protocol being ordered. Pt does not have hx of diabetes per problem list. Pt does not take diabetes medication at home and is not receiving diabetes medication in hospital. A1c this adm 5.29%. Pt does not meet criteria for being seen at this time.     Electronically signed by:  Renetta Jiménez RN  05/29/25 08:45 EDT

## 2025-05-29 NOTE — PLAN OF CARE
Goal Outcome Evaluation:      Pt was seen for skilled ST targeting cognitive-linguistic assessment at bedside. Pt awake and alert upon entry. Agreeable to tx and highly pleasant. Informal oral [facility] examination revealed WFL oral musculature movements, WFL motor speech and WFL vocal quality. SLUMS examination completed, which revealed the following: total score- 22/30; orientation- 3/3; problem solving- 2/3; generative naming- 3/3; delayed memory recall- 5/5; digit reversal (working memory)- 0/2; clock draw (executive functioning)- 0/4; visuospatial- 1/2; story recall- 8/8.     Based on the assessment results, pt presents with a mild-moderate cognitive-linguistic disorder. ST will continue to follow to conduct appropriate cognitive-linguistic tx.             Anticipated Discharge Disposition (SLP): unknown    SLP Diagnosis: mild-moderate, cognitive-linguistic disorder (05/29/25 1000)
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
## 2025-05-29 NOTE — ACP (ADVANCE CARE PLANNING)
visited patient in regards to advance care planning consult.   answered all questions and assisted patient in completing HCR form. Copy placed in patient's chart, copy faxed to medical records, original returned to patient.

## 2025-05-29 NOTE — PLAN OF CARE
Problem: Adult Inpatient Plan of Care  Goal: Plan of Care Review  Outcome: Progressing  Goal: Patient-Specific Goal (Individualized)  Outcome: Progressing  Goal: Absence of Hospital-Acquired Illness or Injury  Outcome: Progressing  Intervention: Identify and Manage Fall Risk  Recent Flowsheet Documentation  Taken 5/29/2025 1600 by Nicolette Fernandez RN  Safety Promotion/Fall Prevention:   activity supervised   assistive device/personal items within reach   clutter free environment maintained   fall prevention program maintained   nonskid shoes/slippers when out of bed   room organization consistent   safety round/check completed  Taken 5/29/2025 1440 by Nicolette Fernandez RN  Safety Promotion/Fall Prevention: safety round/check completed  Taken 5/29/2025 1200 by Nicolette Fernandez RN  Safety Promotion/Fall Prevention: patient off unit  Taken 5/29/2025 1000 by Nicolette Fernandez RN  Safety Promotion/Fall Prevention: safety round/check completed  Taken 5/29/2025 0820 by Nicolette Fernandez RN  Safety Promotion/Fall Prevention:   activity supervised   assistive device/personal items within reach   clutter free environment maintained   fall prevention program maintained   nonskid shoes/slippers when out of bed   room organization consistent   safety round/check completed  Intervention: Prevent Skin Injury  Recent Flowsheet Documentation  Taken 5/29/2025 0820 by Nicolette Fernandez RN  Body Position: sitting up in bed  Intervention: Prevent and Manage VTE (Venous Thromboembolism) Risk  Recent Flowsheet Documentation  Taken 5/29/2025 0820 by Nicolette Fernandez RN  VTE Prevention/Management:   bilateral   SCDs (sequential compression devices) on  Intervention: Prevent Infection  Recent Flowsheet Documentation  Taken 5/29/2025 1600 by Nicolette Fernandez RN  Infection Prevention: single patient room provided  Taken 5/29/2025 1440 by Nicolette Fernandez RN  Infection Prevention: environmental surveillance performed  Taken 5/29/2025 1000 by  Nicolette Fernandez RN  Infection Prevention: single patient room provided  Taken 5/29/2025 0820 by Nicolette Fernandez RN  Infection Prevention: environmental surveillance performed  Goal: Optimal Comfort and Wellbeing  Outcome: Progressing  Intervention: Monitor Pain and Promote Comfort  Recent Flowsheet Documentation  Taken 5/29/2025 1600 by Nicolette Fernandez RN  Pain Management Interventions: position adjusted  Intervention: Provide Person-Centered Care  Recent Flowsheet Documentation  Taken 5/29/2025 1600 by Nicolette Fernandez RN  Trust Relationship/Rapport:   care explained   thoughts/feelings acknowledged  Taken 5/29/2025 0820 by Nicolette Fernandez RN  Trust Relationship/Rapport:   care explained   thoughts/feelings acknowledged   questions answered  Goal: Readiness for Transition of Care  Outcome: Progressing     Problem: Comorbidity Management  Goal: Blood Pressure in Desired Range  Outcome: Progressing  Intervention: Maintain Blood Pressure Management  Recent Flowsheet Documentation  Taken 5/29/2025 0820 by Nicolette Fernandez RN  Medication Review/Management: medications reviewed     Problem: Violence Risk or Actual  Goal: Anger and Impulse Control  Outcome: Progressing  Intervention: Minimize Safety Risk  Recent Flowsheet Documentation  Taken 5/29/2025 1600 by Nicolette Fernandez RN  Enhanced Safety Measures: bed alarm set  Taken 5/29/2025 1440 by Nicolette Fernandez RN  Enhanced Safety Measures: bed alarm set  Taken 5/29/2025 0820 by Nicolette Fernandez RN  Enhanced Safety Measures: bed alarm set     Problem: Stroke, Ischemic (Includes Transient Ischemic Attack)  Goal: Optimal Coping  Outcome: Progressing  Goal: Effective Bowel Elimination  Outcome: Progressing  Goal: Optimal Cerebral Tissue Perfusion  Outcome: Progressing  Goal: Optimal Cognitive Function  Outcome: Progressing  Goal: Improved Communication Skills  Outcome: Progressing  Intervention: Optimize Communication Skills  Recent Flowsheet Documentation  Taken  5/29/2025 0820 by Nicolette Fernandez, RN  Communication Enhancement Strategies: call light answered in person  Goal: Optimal Functional Ability  Outcome: Progressing  Goal: Optimal Nutrition Intake  Outcome: Progressing  Goal: Effective Oxygenation and Ventilation  Outcome: Progressing  Goal: Improved Sensorimotor Function  Outcome: Progressing  Goal: Safe and Effective Swallow  Outcome: Progressing  Goal: Effective Urinary Elimination  Outcome: Progressing     Problem: Alcohol Withdrawal  Goal: Alcohol Withdrawal Symptom Control  Outcome: Progressing  Goal: Optimal Neurologic Function  Outcome: Progressing  Goal: Readiness for Change Identified  Outcome: Progressing   Goal Outcome Evaluation:
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
## 2025-05-29 NOTE — PROGRESS NOTES
Non-billable note  Seen and examined, wife present. Complaining echo was painful. No bruising on inspection. Discussed case with Neurology.
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
## 2025-05-29 NOTE — CASE MANAGEMENT/SOCIAL WORK
Social Work Assessment   [place]     Patient Name: Tyson Rosario  MRN: [id number]  Today's Date: 5/29/2025    Admit Date: 5/29/2025     Discharge Needs Assessment       Row Name 05/29/25 1736       Living Environment    People in Home spouse    Name(s) of People in Home Patient and spouse/Kathy reside together.    Current Living Arrangements home    Potentially Unsafe Housing Conditions none    In the past 12 months has the electric, gas, oil, or water company threatened to shut off services in your home? No    Primary Care Provided by self    Provides Primary Care For no one    Family Caregiver if Needed none    Quality of Family Relationships supportive    Able to Return to Prior Arrangements yes       Resource/Environmental Concerns    Resource/Environmental Concerns none    Transportation Concerns none       Transportation Needs    In the past 12 months, has lack of transportation kept you from medical appointments or from getting medications? no    In the past 12 months, has lack of transportation kept you from meetings, work, or from getting things needed for daily living? No       Food Insecurity    Within the past 12 months, you worried that your food would run out before you got the money to buy more. Sometimes    Within the past 12 months, the food you bought just didn't last and you didn't have money to get more. Never true       Transition Planning    Patient/Family Anticipates Transition to home with family    Patient/Family Anticipated Services at Transition ;skilled nursing    Transportation Anticipated family or friend will provide       Discharge Needs Assessment    Readmission Within the Last 30 Days no previous admission in last 30 days    Current Outpatient/Agency/Support Group outpatient substance abuse treatment    Equipment Currently Used at Home walker, standard;bp cuff    Concerns to be Addressed basic needs;discharge planning;mental health;substance/tobacco abuse/use    Do you want  help finding or keeping work or a job? I do not need or want help    Do you want help with school or training? For example, starting or completing job training or getting a high school diploma, GED or equivalent No    Anticipated Changes Related to Illness none    Equipment Needed After Discharge none    Outpatient/Agency/Support Group Needs inpatient rehabilitation facility;outpatient substance abuse treatment    Discharge Facility/Level of Care Needs nursing facility, basic    Patient's Choice of Community Agency(s) Patient agreeable to SNF-[facility].    Current Discharge Risk physical impairment;substance use/abuse                   Discharge Plan       Row Name 05/29/25 173       Plan    Plan From home with spouse.  Patient is agreeable to [facility].    Patient/Family in Agreement with Plan yes    Plan Comments SW reviewed chart and met with patient and spouse at bedside.  Patient agreeable to have spouse remain in room during assessment.  Due to recent stroke, patient is agreeable to [facility] and if not [facility], [facility] SNF. PASRR QFR.   Patient is able to drive, afford food, utilities and rent.  Spouse works F/T and is able to pay bills at this time.  Patient is unemployed and is going to apply for SSDI.  [facility] resources given.  Patient continues to smoke 2 packs of cigarettes daily, consumes 1/5 of bourbon daily.  No illicit substance abuse.  Hx of Methadone use (130mg daily).  Stopped use 7/2022.  After stopping use, patient began consuming alcohol. CIWA protocol in place.   He has been in/out of counseling, AA, and NA.  Patient reports having anxiety although he does not take medication.  No current SI/HI.  Patient agreeable to Mental Health resources which were given to pt. Patient also agreeable to Inpatient/IOP resources s/p SNF.   Patient is non-adherent with medications.  He has not seen a PCP since 2022.  Patient is agreeable to see Lisa Meng MD again if he will accept patient.  RHINA  will attempt to set up PCP appointment for pt.  Pharmacy used is [facility] in [place]. Patient agreeable to MTB and it will be reflected in Epic.  DME in home includes a walker and bp cuff.  Patient may need transportation to [facility] or SNF at time of d/c.                       Demographic Summary       Row Name 05/29/25 1733       General Information    Admission Type inpatient    Arrived From emergency department    Referral Source admission list    Reason for Consult discharge planning;community resources;mental health concerns;substance use concerns    Preferred Language English       Contact Information    Permission Granted to Share Info With     Contact Information Obtained for                    Functional Status       Row Name 05/29/25 1738       Functional Status    Usual Activity Tolerance moderate    Current Activity Tolerance moderate       Assessment of Health Literacy    How often do you have someone help you read hospital materials? Occasionally    How often do you have problems learning about your medical condition because of difficulty understanding written information? Occasionally    How often do you have a problem understanding what is told to you about your medical condition? Occasionally    How confident are you filling out medical forms by yourself? Somewhat    Health Literacy Good       Functional Status, IADL    Medications independent;assistive person    Meal Preparation assistive person;independent    Housekeeping assistive person;independent    Laundry assistive person;independent    Shopping assistive person;independent    If for any reason you need help with day-to-day activities such as bathing, preparing meals, shopping, managing finances, etc., do you get the help you need? I get all the help I need       Mental Status    General Appearance WDL WDL       Mental Status Summary    Recent Changes in Mental Status/Cognitive Functioning no changes       Employment/     Employment Status unemployed    Employment/ Comments Patient given [facility] resources for SSDI application.                   Psychosocial    No documentation.                  Abuse/Neglect    No documentation.                  Legal    No documentation.                  Substance Abuse       Row Name 05/29/25 1735       Substance Use    Substance Use Comment Patient continues to smoke 2 packs of cigarettes daily, consumes 1/5 of bourbon daily, no illicit substance use.       AUDIT-C (Alcohol Use Disorders Identification Test)    Q1: How often do you have a drink containing alcohol? 4 or more ti    Q2: How many drinks containing alcohol do you have on a typical day when you are drinking? 7 to 9    Q3: How often do you have six or more drinks on one occasion? Daily    Audit-C Score 11               LAURENT Shah MSW    Phone: [phone]  Cell: [phone]  Fax: [phone]  [email or web address]
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
## 2025-05-29 NOTE — CONSULTS
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 ventricular diastolic function was normal.    Saline test results are negative.        Stress Test:        Cardiac Catheterization:  Results for orders placed during the hospital encounter of 03/17/20    Cardiac Catheterization/Vascular Study    Narrative  CARDIAC CATHETERIZATION REPORT      DATE OF PROCEDURE:  3/17/2020    INDICATION FOR PROCEDURE:    Abnormal stress test  Chest pain  Shortness of breath    PROCEDURE PERFORMED:    Left heart catheterization  coronary angiography  left ventriculography    PROCEDURE COMMENTS:    After informed consent was obtained, the patient was prepped and draped in the usual sterile manner.  Mild to moderate sedation was administered.  Right femoral artery was accessed without difficulty and 6 [place] arterial sheath was inserted.  Sheath was flushed with heparinized saline.    Using 6 [place] Kylah catheters, first left coronary artery and the right coronary was electively engaged and appropriate views were taken.  A 6 [place] JR4 catheter was used to cross aortic valve and left heart catheterization was performed.  Left ventriculography was done in a review.    Patient tolerated the procedure well.    FINDINGS:    1. HEMODYNAMICS:    Aortic pressure: 122/65 mmHg    LVEDP: 5 mmHg    Gradient across aortic valve on pullback: No gradient across  aortic valve      2. LEFT VENTRICULOGRAPHY: 55%      3. CORONARY ANGIOGRAPHY:    A: Left main coronary artery: Normal    B: Left anterior descending artery: Minimal plaquing noted in proximal and mid LAD.  No high-grade stenosis    C: Left circumflex coronary artery: 10% plaquing in the mid LCx    D: Right coronary artery: 20 to 25% plaque in the proximal segment of RCA.  RCA is dominant vessel    SUMMARY:    Minimal coronary artery disease  Preserved left ventricular function    RECOMMENDATIONS:    Medical treatment        Other:    ASCVD Risk Score::  The ASCVD Risk score (Raman LYNN, et al., 2019) failed to calculate for the following reasons:    Risk score cannot be calculated because patient has a medical history suggesting prior/existing ASCVD          ASSESSMENT & PLAN:    Principal Problem:    Stroke    Acute to subacute strokes within the right MCA territory incoling right temporal lobe and right pre and postcentral cyri. Strokes appear embolic.   CT head: No hemorrhage   MRI brain: Reviewed   CTA head and neck: The read is the only available from outlying facility reads as moderate stenosis of the right ICA.  Echo: EF is 56-60%, no RWMA  EKG (5/28) sinus rhythm, rate 100  Labs: A1C: 5.29, B12: P, LDL: 122, TSH: 3.080  Antithrombotics: Cont. DAPT for now   Statin: Awaiting LFT  - PT/OT/ST as appropriate, Neuro checks per protocol, DVT prophylaxis, Stroke education  - Cardiology consult for work-up: BAHMAN and event monitor for 30 days recommended (rule out atrial fibrillation/paroxysmal atrial fibrillation, atrial septal or left ventricular aneurysm, patent carty ovale, thrombus, atheroma, etc.)  -Check CTA chest, abdomen and pelvis to rule out underlying malignancy  - Additional recommendations per neurology    Essential Hypertension   BP goal 130/90, avoid hypotension per neurology    Alcohol/ tobacco abuse   Discussion on smoking and alcohol cessation   CIWA protocol per admitting    Elevated  troponin   Possibly due to uncontrolled hypertension, HTN emergency   Flat, EKG without changes, no WMA on echo    Congestive heart failure   Normal LV function    MDM  Manage blood pressure per neurology post stroke blood pressures  Dual antiplatelet therapy per neurology  Referred for BAHMAN/event monitor, will d/w Dr. Bills/Livia from timing  Possible ischemic eval-last Stress Test Text and Cardiac Catheterization Was in 2020    Will consider repeat at later date with stress/ no RWMA on echo    Spencer Miller MD

## 2025-05-29 NOTE — PLAN OF CARE
"Goal Outcome Evaluation:  Plan of Care Reviewed With: patient, spouse  Pt presents as a 53 y/o M admitted to [facility] on 5/29/25 with left sided weakness and tingling. Medical history of HTN, Alcohol abuse,Tobacco Abuse, left knee reconstruction.  At [place], a CT of the head showed no acute abnormality. CTA of the head and neck showed an age indeterminate infarct in the right parietal and temporal lobes and moderate plaque in the right ICA. MRI on arrival to [facility] shows \"Multiple areas of acute infarct are present likely within a right MCA distribution with prominent perirolandic and right temporal lobe involvement. There is no evidence of associated mass effect or hemorrhage. Pt A and O x 4, pleasant and cooperative. Pt is hypertensive- RN aware and OK to mobilize. Pt resides with spouse in [facility] with ramp entry and he is typically independent with community mobility without AD but he does report a history of balance issues and walking with a limp. Pt has ataxia and weakness noted in the LUE, with weakness much worse distally. Pt can form a weak grasp. Left hand is edematous and reddened. Pt transfers with min assist and ambulated 20 feet with min assist of 2 using hand held assist with decreased weight shift and decreased heel strike BLE. Pt unable to grasp walker adequately with left hand when walker was trialed. Patient is a high risk of injurious falls and unsafe to return to prior living environment at this time.  PT will follow pt for neuro deficits related to his acute CVA. PT recommendation is In-patient Rehabilitation Facility.              Anticipated Discharge Disposition (PT): inpatient rehabilitation facility                        "

## 2025-05-30 LAB
ANION GAP SERPL CALCULATED.3IONS-SCNC: 8.4 MMOL/L (ref 5–15)
BUN SERPL-MCNC: 7.6 MG/DL (ref 6–20)
BUN/CREAT SERPL: 11.9 (ref 7–25)
CALCIUM SPEC-SCNC: 8.9 MG/DL (ref 8.6–10.5)
CHLORIDE SERPL-SCNC: 107 MMOL/L (ref 98–107)
CO2 SERPL-SCNC: 25.6 MMOL/L (ref 22–29)
CREAT SERPL-MCNC: 0.64 MG/DL (ref 0.76–1.27)
DEPRECATED RDW RBC AUTO: 59.8 FL (ref 37–54)
EGFRCR SERPLBLD CKD-EPI 2021: 111.1 ML/MIN/1.73
ERYTHROCYTE [DISTWIDTH] IN BLOOD BY AUTOMATED COUNT: 14.2 % (ref 12.3–15.4)
GLUCOSE BLDC GLUCOMTR-MCNC: 121 MG/DL (ref 70–105)
GLUCOSE BLDC GLUCOMTR-MCNC: 121 MG/DL (ref 70–105)
GLUCOSE BLDC GLUCOMTR-MCNC: 122 MG/DL (ref 70–105)
GLUCOSE SERPL-MCNC: 116 MG/DL (ref 65–99)
HCT VFR BLD AUTO: 44.9 % (ref 37.5–51)
HGB BLD-MCNC: 15.9 G/DL (ref 13–17.7)
MCH RBC QN AUTO: 40.2 PG (ref 26.6–33)
MCHC RBC AUTO-ENTMCNC: 35.4 G/DL (ref 31.5–35.7)
MCV RBC AUTO: 113.4 FL (ref 79–97)
PLATELET # BLD AUTO: 111 10*3/MM3 (ref 140–450)
PMV BLD AUTO: 11.1 FL (ref 6–12)
POTASSIUM SERPL-SCNC: 4 MMOL/L (ref 3.5–5.2)
RBC # BLD AUTO: 3.96 10*6/MM3 (ref 4.14–5.8)
SODIUM SERPL-SCNC: 141 MMOL/L (ref 136–145)
WBC NRBC COR # BLD AUTO: 4.77 10*3/MM3 (ref 3.4–10.8)

## 2025-05-30 PROCEDURE — 85027 COMPLETE CBC AUTOMATED: CPT

## 2025-05-30 PROCEDURE — 25010000002 DIAZEPAM PER 5 MG

## 2025-05-30 PROCEDURE — 80048 BASIC METABOLIC PNL TOTAL CA: CPT

## 2025-05-30 PROCEDURE — 97530 THERAPEUTIC ACTIVITIES: CPT

## 2025-05-30 PROCEDURE — 25010000002 HYDRALAZINE PER 20 MG

## 2025-05-30 PROCEDURE — 25010000002 GADOTERIDOL PER 1 ML

## 2025-05-30 PROCEDURE — 25010000002 THIAMINE PER 100 MG

## 2025-05-30 PROCEDURE — 99233 SBSQ HOSP IP/OBS HIGH 50: CPT | Performed by: INTERNAL MEDICINE

## 2025-05-30 PROCEDURE — 25010000002 FOLIC ACID 5 MG/ML SOLUTION 10 ML VIAL

## 2025-05-30 PROCEDURE — 99232 SBSQ HOSP IP/OBS MODERATE 35: CPT | Performed by: NURSE PRACTITIONER

## 2025-05-30 PROCEDURE — 82948 REAGENT STRIP/BLOOD GLUCOSE: CPT

## 2025-05-30 PROCEDURE — 97116 GAIT TRAINING THERAPY: CPT

## 2025-05-30 PROCEDURE — 25010000002 CYANOCOBALAMIN PER 1000 MCG: Performed by: NURSE PRACTITIONER

## 2025-05-30 PROCEDURE — A9579 GAD-BASE MR CONTRAST NOS,1ML: HCPCS

## 2025-05-30 PROCEDURE — 92507 TX SP LANG VOICE COMM INDIV: CPT

## 2025-05-30 RX ORDER — CYANOCOBALAMIN 1000 UG/ML
1000 INJECTION, SOLUTION INTRAMUSCULAR; SUBCUTANEOUS
Status: DISCONTINUED | OUTPATIENT
Start: 2025-05-31 | End: 2025-06-02 | Stop reason: HOSPADM

## 2025-05-30 RX ORDER — LOSARTAN POTASSIUM 25 MG/1
12.5 TABLET ORAL
Status: DISCONTINUED | OUTPATIENT
Start: 2025-05-31 | End: 2025-06-02 | Stop reason: HOSPADM

## 2025-05-30 RX ORDER — CYANOCOBALAMIN 1000 UG/ML
1000 INJECTION, SOLUTION INTRAMUSCULAR; SUBCUTANEOUS ONCE
Status: COMPLETED | OUTPATIENT
Start: 2025-05-30 | End: 2025-05-30

## 2025-05-30 RX ORDER — FUROSEMIDE 20 MG/1
20 TABLET ORAL DAILY
Status: DISCONTINUED | OUTPATIENT
Start: 2025-05-30 | End: 2025-06-02 | Stop reason: HOSPADM

## 2025-05-30 RX ORDER — LOSARTAN POTASSIUM 25 MG/1
25 TABLET ORAL
Status: DISCONTINUED | OUTPATIENT
Start: 2025-05-31 | End: 2025-05-30

## 2025-05-30 RX ADMIN — GADOTERIDOL 15 ML: 279.3 INJECTION, SOLUTION INTRAVENOUS at 00:03

## 2025-05-30 RX ADMIN — CLOPIDOGREL BISULFATE 75 MG: 75 TABLET, FILM COATED ORAL at 08:42

## 2025-05-30 RX ADMIN — HYDRALAZINE HYDROCHLORIDE 10 MG: 20 INJECTION INTRAMUSCULAR; INTRAVENOUS at 21:30

## 2025-05-30 RX ADMIN — Medication 5 MG: at 21:21

## 2025-05-30 RX ADMIN — DIAZEPAM 15 MG: 5 TABLET ORAL at 12:27

## 2025-05-30 RX ADMIN — ASPIRIN 325 MG ORAL TABLET 325 MG: 325 PILL ORAL at 08:42

## 2025-05-30 RX ADMIN — DIAZEPAM 15 MG: 5 TABLET ORAL at 21:29

## 2025-05-30 RX ADMIN — THIAMINE HYDROCHLORIDE 200 MG: 100 INJECTION, SOLUTION INTRAMUSCULAR; INTRAVENOUS at 06:17

## 2025-05-30 RX ADMIN — DIAZEPAM 20 MG: 5 TABLET ORAL at 18:01

## 2025-05-30 RX ADMIN — THIAMINE HYDROCHLORIDE 200 MG: 100 INJECTION, SOLUTION INTRAMUSCULAR; INTRAVENOUS at 21:21

## 2025-05-30 RX ADMIN — NICOTINE 1 PATCH: 21 PATCH TRANSDERMAL at 07:47

## 2025-05-30 RX ADMIN — FUROSEMIDE 20 MG: 20 TABLET ORAL at 14:53

## 2025-05-30 RX ADMIN — DIAZEPAM 15 MG: 5 TABLET ORAL at 08:46

## 2025-05-30 RX ADMIN — DIAZEPAM 10 MG: 5 TABLET ORAL at 14:58

## 2025-05-30 RX ADMIN — ACETAMINOPHEN 650 MG: 325 TABLET, FILM COATED ORAL at 06:21

## 2025-05-30 RX ADMIN — AMLODIPINE BESYLATE 5 MG: 5 TABLET ORAL at 08:42

## 2025-05-30 RX ADMIN — ATORVASTATIN CALCIUM 80 MG: 40 TABLET, FILM COATED ORAL at 21:21

## 2025-05-30 RX ADMIN — DIAZEPAM 15 MG: 5 TABLET ORAL at 06:26

## 2025-05-30 RX ADMIN — THIAMINE HYDROCHLORIDE 200 MG: 100 INJECTION, SOLUTION INTRAMUSCULAR; INTRAVENOUS at 13:28

## 2025-05-30 RX ADMIN — CYANOCOBALAMIN 1000 MCG: 1000 INJECTION, SOLUTION INTRAMUSCULAR; SUBCUTANEOUS at 09:56

## 2025-05-30 RX ADMIN — DIAZEPAM 10 MG: 10 INJECTION, SOLUTION INTRAMUSCULAR; INTRAVENOUS at 23:55

## 2025-05-30 RX ADMIN — DIAZEPAM 15 MG: 5 TABLET ORAL at 03:15

## 2025-05-30 RX ADMIN — FOLIC ACID 1 MG: 5 INJECTION, SOLUTION INTRAMUSCULAR; INTRAVENOUS; SUBCUTANEOUS at 08:42

## 2025-05-30 RX ADMIN — ACETAMINOPHEN 650 MG: 325 TABLET, FILM COATED ORAL at 23:06

## 2025-05-30 NOTE — PLAN OF CARE
Assessment: Tyson Rosario presents with ADL impairments affecting function including balance, coordination, endurance / activity tolerance, grasp, postural / trunk control, range of motion (ROM), and strength. Pt frustrated this date but willing to get OOB and participate in therapy. Improved ability to grasp the RW noted this date, though still having difficulty with left hand slipping from hand  many times. Encouraged pt to exercise the LUE and use for functional tasks frequently. He continues to require assist with ADL care secondary to LUE weakness. Demonstrated functioning below baseline abilities indicate the need for continued skilled intervention while inpatient. Tolerating session today without incident. Will continue to follow and progress as tolerated.      Plan/Recommendations:   High Intensity Therapy recommended post-acute care. This is recommended as therapy feels the patient would require 5-6 days per week, 2-3 hours per day. At this time, inpatient rehabilitation (acute rehab) would be the first choice and SNF would be second.. Pt requires no DME at discharge.      Pt desires Inpatient Rehabilitation placement at discharge. Pt cooperative; agreeable to therapeutic recommendations and plan of care.

## 2025-05-30 NOTE — CONSULTS
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 proximal and mid LAD.  No high-grade stenosis    C: Left circumflex coronary artery: 10% plaquing in the mid LCx    D: Right coronary artery: 20 to 25% plaque in the proximal segment of RCA.  RCA is dominant vessel    SUMMARY:    Minimal coronary artery disease  Preserved left ventricular function    RECOMMENDATIONS:    Medical treatment        Other:    ASCVD Risk Score::  The ASCVD Risk score (Raman LYNN, et al., 2019) failed to calculate for the following reasons:    Risk score cannot be calculated because patient has a medical history suggesting prior/existing ASCVD          ASSESSMENT & PLAN:    Principal Problem:    Stroke    Acute to subacute strokes within the right MCA territory incoling right temporal lobe and right pre and postcentral cyri. Strokes appear embolic.   CT head: No hemorrhage   MRI brain: Reviewed   CTA head and neck: The read is the only available from outlying facility reads as moderate stenosis of the right ICA.  Echo: EF is 56-60%, no RWMA  EKG (5/28) sinus rhythm, rate 100  Labs: A1C: 5.29, B12: P, LDL: 122, TSH: 3.080  Antithrombotics: Cont. DAPT for now   Statin: Awaiting LFT  - PT/OT/ST as appropriate, Neuro checks per protocol, DVT prophylaxis, Stroke education  - Cardiology consult for work-up: BAHMAN and event monitor  for 30 days recommended (rule out atrial fibrillation/paroxysmal atrial fibrillation, atrial septal or left ventricular aneurysm, patent carty ovale, thrombus, atheroma, etc.)  -Check CTA chest, abdomen and pelvis to rule out underlying malignancy  - Additional recommendations per neurology    Essential Hypertension   BP goal 130/90, avoid hypotension per neurology    Alcohol/ tobacco abuse   Discussion on smoking and alcohol cessation   CIWA protocol per admitting    Elevated troponin   Possibly due to uncontrolled hypertension, HTN emergency   Flat, EKG without changes, no WMA on echo    Congestive heart failure   Normal LV function    MDM  Manage blood pressure per neurology post stroke blood pressures  Added amlodipine 5 daily  If still high would add losartan 25 daily  Goal less than 140 systolic optimally less than 130 over the next 48 to 72 hours    Dual antiplatelet therapy per neurology, high intensity statin on board  Referred for BAHMAN/event monitor, will d/w performing physicians on timing, possibly as outpatient  Possible ischemic eval-last Stress Test Text and Cardiac Catheterization Was in 2020    Will consider repeat at later date with stress/ no RWMA on echo    Spencer Miller MD

## 2025-05-30 NOTE — PROGRESS NOTES
[facility] MEDICINE SERVICE  DAILY PROGRESS NOTE    NAME: Tyson Rosario  : 1970  MRN: [id number]      LOS: 1 day     PROVIDER OF SERVICE: Kevin Thorne MD    Chief Complaint: Stroke    Subjective:     Interval History:  History taken from: patient    Seen and examined at bedside, wife also present.  He is really frustrated as he was not made n.p.o. and now BAHMAN has been delayed.        Review of Systems:   Review of Systems negative except as mentioned above    Objective:     Vital Signs  Temp:  [97.1 °F (36.2 °C)-97.5 °F (36.4 °C)] 97.1 °F (36.2 °C)  Heart Rate:  [84-96] 84  Resp:  [14-24] 21  BP: (154-162)/() 162/100   Body mass index is 28.89 kg/m².    Physical Exam  Physical Exam  Constitutional:       Appearance: Normal appearance.   Cardiovascular:      Rate and Rhythm: Normal rate and regular rhythm.   Pulmonary:      Effort: Pulmonary effort is normal.   Musculoskeletal:      Left hand: Edema present.   Skin:     General: Skin is warm and dry.   Neurological:      General: No focal deficit present.      Mental Status: He is alert and oriented to person, place, and time. Mental status is at baseline.      Motor: Weakness (Left ) present.   Psychiatric:         Mood and Affect: Mood normal.         Behavior: Behavior normal.      Diagnostic Data    Results from last 7 days   Lab Units 25  0215 25  1406 25  0419   WBC 10*3/mm3 4.77  --   --    HEMOGLOBIN g/dL 15.9  --   --    HEMATOCRIT % 44.9  --   --    PLATELETS 10*3/mm3 111*  --   --    GLUCOSE mg/dL 116*  --   --    CREATININE mg/dL 0.64*  --   --    BUN mg/dL 7.6  --   --    SODIUM mmol/L 141  --   --    POTASSIUM mmol/L 4.0   < >  --    AST (SGOT) U/L  --   --  39   ALT (SGPT) U/L  --   --  25   ALK PHOS U/L  --   --  108   BILIRUBIN mg/dL  --   --  0.9   ANION GAP mmol/L 8.4  --   --     < > = values in this interval not displayed.       MRI Brain With & Without Contrast  Result Date: 2025  Impression:  No significant short interval acute change. Redemonstrated and stable appearing multifocal infarcts within the right MCA territory, without evidence of new ischemia, worsening mass effect or hemorrhage. These areas demonstrate some intrinsic cortical T1 shortening and enhancement suggesting laminar necrosis. The predominance of diffusion restriction and vascular distribution of findings strongly indicates ischemic etiology, however if there is sufficient clinical concern for intracranial infectious process, concurrent encephalitis is difficult to entirely exclude on imaging. Electronically Signed: Martin Shearer MD  5/30/2025 6:45 AM EDT  Workstation ID: [id number]    CT Chest With Contrast Diagnostic  Result Date: 5/29/2025  1. No findings of malignancy in the chest, abdomen, and pelvis 2. Calcific bilateral pleural thickening likely related to remote hemothorax or empyema. Multiple old bilateral rib fractures are noted 3. Coronary artery atherosclerosis 4. Aortic valve calcification which can be associated with valvular aortic stenosis 5. Steatosis of the left hepatic lobe 6. Cholelithiasis with findings suggesting chronic gallbladder disease Electronically Signed: Anoop Ashford  5/29/2025 3:10 PM EDT  Workstation ID: [id number]    CT Abdomen Pelvis With Contrast  Result Date: 5/29/2025  1. No findings of malignancy in the chest, abdomen, and pelvis 2. Calcific bilateral pleural thickening likely related to remote hemothorax or empyema. Multiple old bilateral rib fractures are noted 3. Coronary artery atherosclerosis 4. Aortic valve calcification which can be associated with valvular aortic stenosis 5. Steatosis of the left hepatic lobe 6. Cholelithiasis with findings suggesting chronic gallbladder disease Electronically Signed: Anoop Ashford  5/29/2025 3:10 PM EDT  Workstation ID: [id number]    MRI Brain Without Contrast  Result Date: 5/29/2025  Impression: Multiple areas of acute infarct are present likely within a  right MCA distribution with prominent perirolandic and right temporal lobe involvement. There is no evidence of associated mass effect or hemorrhage. Electronically Signed: Martin Shearer MD  5/29/2025 6:38 AM EDT  Workstation ID: [id number]        I reviewed the patient's new clinical results.    Assessment/Plan:     Active and Resolved Problems  Active Hospital Problems    Diagnosis  POA    **Stroke [I63.9]  Yes      Resolved Hospital Problems   No resolved problems to display.       Acute to subacute strokes within the right MCA territory incoling right temporal lobe and right pre and postcentral cyri. Strokes appear embolic.   -Last known normal 5/25/25  -CT of the head showed no acute process  -CTA of the head and neck showed an age indeterminate infarct in the right parietal and temporal lobes and moderate plaque in the right ICA  -Aspirin ordered  -Lipitor ordered  -Neurochecks ordered  -Lipid panel, A1C, TSH, ESR, Folate and B12 labs ordered  -PT/OT consults  -MRI of the brain reviewed  -2D echo ordered  -Neurology following, recommend DAPT for now.     Essential Hypertension  -Noncompliant with medications  -Uncontrolled, initial BP was 200/130  -Improved with IV Hydralazine  -Restart home medications     Alcohol Abuse  Tobacco Abuse  -Reports drinking 1/5 of [place] daily  -Last drink on Tuesday, 5/27/25  -Alcohol withdrawal protocol ordered  -Thiamine and Folic Acid replacement  -Electrolyte replacement protocol ordered  -Seizure, Safety precautions  -Nicotine patch ordered     Elevated Troponin  -Possibly due to chronically uncontrolled HTN  -Troponin at outside facility 288, 290  -Trend Troponin here  -Denies Chest pain  -EKG showed SR  -Continuous cardiac monitoring  - Cardiology following, planning for BAHMAN and possible ischemic evaluation    VTE Prophylaxis:  Mechanical VTE prophylaxis orders are present.             Disposition Planning:     Barriers to Discharge: BAHMAN, alcohol withdrawal  Anticipated  Date of Discharge: 6/3  Place of Discharge: Rehab      Time: 35 minutes     Code Status and Medical Interventions: CPR (Attempt to Resuscitate); Full Support   Ordered at: 05/29/25 0119     Code Status (Patient has no pulse and is not breathing):    CPR (Attempt to Resuscitate)     Medical Interventions (Patient has pulse or is breathing):    Full Support       Signature: Electronically signed by Kevin Thorne MD, 05/30/25, 13:06 EDT.  [facility] Hospitalist Team

## 2025-05-30 NOTE — PLAN OF CARE
Problem: Adult Inpatient Plan of Care  Goal: Plan of Care Review  Outcome: Adequate for Care Transition  Flowsheets (Taken 5/30/2025 0536)  Progress: no change  Plan of Care Reviewed With: patient  Goal: Patient-Specific Goal (Individualized)  Outcome: Adequate for Care Transition  Goal: Absence of Hospital-Acquired Illness or Injury  Outcome: Adequate for Care Transition  Goal: Optimal Comfort and Wellbeing  Outcome: Adequate for Care Transition  Goal: Readiness for Transition of Care  Outcome: Adequate for Care Transition     Problem: Comorbidity Management  Goal: Blood Pressure in Desired Range  Outcome: Adequate for Care Transition     Problem: Violence Risk or Actual  Goal: Anger and Impulse Control  Outcome: Adequate for Care Transition     Problem: Stroke, Ischemic (Includes Transient Ischemic Attack)  Goal: Optimal Coping  Outcome: Adequate for Care Transition  Goal: Effective Bowel Elimination  Outcome: Adequate for Care Transition  Goal: Optimal Cerebral Tissue Perfusion  Outcome: Adequate for Care Transition  Goal: Optimal Cognitive Function  Outcome: Adequate for Care Transition  Goal: Improved Communication Skills  Outcome: Adequate for Care Transition  Goal: Optimal Functional Ability  Outcome: Adequate for Care Transition  Goal: Optimal Nutrition Intake  Outcome: Adequate for Care Transition  Goal: Effective Oxygenation and Ventilation  Outcome: Adequate for Care Transition  Goal: Improved Sensorimotor Function  Outcome: Adequate for Care Transition  Goal: Safe and Effective Swallow  Outcome: Adequate for Care Transition  Goal: Effective Urinary Elimination  Outcome: Adequate for Care Transition     Problem: Alcohol Withdrawal  Goal: Alcohol Withdrawal Symptom Control  Outcome: Adequate for Care Transition  Goal: Optimal Neurologic Function  Outcome: Adequate for Care Transition  Goal: Readiness for Change Identified  Outcome: Adequate for Care Transition     Problem: Fall Injury Risk  Goal: Absence  Assessment/Plan: For his diabetes, we will discontinue the Lantus and start him on Xultophy  He will start at 10 units daily, and ramp up to units every couple days  He will continue to check his sugars  He will call us if they are not coming down  Flu shot given today  We will also refer him to Endocrinology in case the Hayden Dietrich does not help  We will see him back in 3 months or p r n  Problem List Items Addressed This Visit        Endocrine    Type 2 diabetes mellitus with hyperglycemia (HCC) - Primary    Relevant Medications    Insulin Degludec-Liraglutide (XULTOPHY) 100 units-3 6 mg/mL injection pen    metFORMIN (GLUCOPHAGE) 1000 MG tablet    Other Relevant Orders    Ambulatory referral to Endocrinology      Other Visit Diagnoses     Encounter for immunization        Relevant Orders    influenza vaccine, [id number], quadrivalent, recombinant, PF, 0 5 mL, for patients 18 yr+ (FLUBLOK) (Completed)           Diagnoses and all orders for this visit:    Type 2 diabetes mellitus with hyperglycemia, without long-term current use of insulin (HCC)  -     Insulin Degludec-Liraglutide (XULTOPHY) 100 units-3 6 mg/mL injection pen; Inject 10 Units under the skin daily  -     metFORMIN (GLUCOPHAGE) 1000 MG tablet; Take 1 tablet (1,000 mg total) by mouth 2 (two) times a day with meals  -     Ambulatory referral to Endocrinology; Future    Encounter for immunization  -     influenza vaccine, [id number], quadrivalent, recombinant, PF, 0 5 mL, for patients 18 yr+ (FLUBLOK)        No problem-specific Assessment & Plan notes found for this encounter  Subjective:      Patient ID: Sarika Nichols is a 60 y o  male  Patient here today follow-up  Patient denies any chest pain or shortness of breath  Unfortunately, his hemoglobin A1c went up  He is taking 30 units of Lantus daily  He has been really watching his diet  He does continue to lose weight  Diabetes   He presents for his follow-up diabetic visit   He has of Fall and Fall-Related Injury  Outcome: Adequate for Care Transition   Goal Outcome Evaluation:  Plan of Care Reviewed With: patient        Progress: no change                                 type 2 diabetes mellitus  His disease course has been worsening  There are no hypoglycemic associated symptoms  There are no diabetic associated symptoms  There are no hypoglycemic complications  Symptoms are worsening  There are no diabetic complications  Risk factors for coronary artery disease include male sex and diabetes mellitus  Current diabetic treatment includes insulin injections and oral agent (monotherapy)  He is compliant with treatment all of the time  His weight is decreasing steadily  He is following a generally healthy and diabetic diet  Meal planning includes avoidance of concentrated sweets  He has not had a previous visit with a dietitian  He participates in exercise intermittently  His home blood glucose trend is increasing steadily  An ACE inhibitor/angiotensin II receptor blocker is being taken  The following portions of the patient's history were reviewed and updated as appropriate:   He has a past medical history of Diabetes mellitus ([address] )  ,  does not have any pertinent problems on file  ,   has a past surgical history that includes Hernia repair  ,  family history includes Cancer in his father; No Known Problems in his mother  ,   reports that he has never smoked  He has never used smokeless tobacco  He reports that he drinks alcohol  He reports that he does not use drugs  ,  is allergic to jardiance [empagliflozin]     Current Outpatient Medications   Medication Sig Dispense Refill    atorvastatin (LIPITOR) 10 mg tablet Take 1 tablet (10 mg total) by mouth daily 30 tablet 5    losartan (COZAAR) 25 mg tablet Take 1 tablet (25 mg total) by mouth daily 30 tablet 5    metFORMIN (GLUCOPHAGE) 1000 MG tablet Take 1 tablet (1,000 mg total) by mouth 2 (two) times a day with meals 60 tablet 5    Insulin Degludec-Liraglutide (XULTOPHY) 100 units-3 6 mg/mL injection pen Inject 10 Units under the skin daily 5 pen 5     No current facility-administered medications for this visit          Review of Systems   Constitutional: Negative  Respiratory: Negative  Cardiovascular: Negative  Gastrointestinal: Negative  Genitourinary: Negative  Objective:  Vitals:    10/30/19 1441   BP: 118/80   Weight: 121 kg (267 lb 12 8 oz)   Height: 6' 3" (1 905 m)     Body mass index is 33 47 kg/m²  Physical Exam   Constitutional: He is oriented to person, place, and time  He appears well-developed and well-nourished  No distress  HENT:   Head: Normocephalic and atraumatic  Eyes: Conjunctivae are normal    Cardiovascular: Normal rate, regular rhythm and normal heart sounds  Exam reveals no gallop and no friction rub  No murmur heard  Pulmonary/Chest: Effort normal and breath sounds normal  No respiratory distress  He has no wheezes  He has no rales  Musculoskeletal: He exhibits no edema  Neurological: He is alert and oriented to person, place, and time  Skin: He is not diaphoretic  Psychiatric: He has a normal mood and affect  His behavior is normal  Judgment and thought content normal    Vitals reviewed
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
## 2025-05-30 NOTE — PLAN OF CARE
"Goal Outcome Evaluation:  Plan of Care Reviewed With: patient, spouse  Assessment: Tyson Rosario presents with functional mobility impairments which indicate the need for skilled intervention. MRI on arrival to [facility] shows \"Multiple areas of acute infarct are present likely within a right MCA distribution with prominent perirolandic and right temporal lobe involvement. Pt was able to lightly grasp rolling walker with left hand today which improved his steadiness with gait. Pt was hypertensive - RN aware. PT cued pt for equal step length BLE and improved heel strike at initial contact. Pt has decreased righting reactions in standing and thus he is at high risk for falls. Tolerating session today without incident. Will continue to follow and progress as tolerated.     Plan/Recommendations:   If medically appropriate, High Intensity Therapy recommended post-acute care. This is recommended as therapy feels the patient would require 5-6 days per week, 2-3 hours per day. At this time, inpatient rehabilitation (acute rehab) would be the first choice and SNF would be second. Pt requires no DME at discharge.     Pt desires Skilled Rehab placement at discharge. Pt cooperative; agreeable to therapeutic recommendations and plan of care.           Anticipated Discharge Disposition (PT): inpatient rehabilitation facility                        "

## 2025-05-30 NOTE — PLAN OF CARE
Problem: Violence Risk or Actual  Goal: Anger and Impulse Control  Outcome: Progressing  Intervention: Minimize Safety Risk  Recent Flowsheet Documentation  Taken 5/30/2025 1225 by Julianne Bradley RN  Sensory Stimulation Regulation: care clustered  Enhanced Safety Measures: bed alarm set  Taken 5/30/2025 1000 by Julianne Bradley RN  Enhanced Safety Measures: bed alarm set  Taken 5/30/2025 0842 by Julianne Bradley RN  Sensory Stimulation Regulation: care clustered  Enhanced Safety Measures: bed alarm set     Problem: Fall Injury Risk  Goal: Absence of Fall and Fall-Related Injury  Outcome: Progressing  Intervention: Identify and Manage Contributors  Recent Flowsheet Documentation  Taken 5/30/2025 1225 by Julianne Bardley RN  Medication Review/Management: medications reviewed  Taken 5/30/2025 1000 by Julianne Bradley RN  Medication Review/Management: medications reviewed  Taken 5/30/2025 0842 by Julianne Bradley RN  Medication Review/Management: medications reviewed  Intervention: Promote Injury-Free Environment  Recent Flowsheet Documentation  Taken 5/30/2025 1225 by Julianne Bradley RN  Safety Promotion/Fall Prevention:   activity supervised   assistive device/personal items within reach   clutter free environment maintained   fall prevention program maintained   nonskid shoes/slippers when out of bed   room organization consistent   safety round/check completed  Taken 5/30/2025 1000 by Julianne Bradley RN  Safety Promotion/Fall Prevention:   activity supervised   assistive device/personal items within reach   clutter free environment maintained   fall prevention program maintained   nonskid shoes/slippers when out of bed   room organization consistent   safety round/check completed  Taken 5/30/2025 0842 by Julianne Bradley RN  Safety Promotion/Fall Prevention:   activity supervised   assistive device/personal items within reach   clutter free environment maintained   fall prevention  program maintained   nonskid shoes/slippers when out of bed   room organization consistent   safety round/check completed   Goal Outcome Evaluation:

## 2025-05-30 NOTE — DISCHARGE INSTR - APPOINTMENTS
PRIMARY CARE DOCTOR APPOINTMENT: DR. MANISHA SANTO  [address] [place] [address]  ON TUESDAY , JUNE 17, 2025 AT 930AM.  PLEASE ARRIVE 30 MINUTES EARLY AND BRING ID AND INSURANCE CARD.  (THIS IS LOCATED AT THE [facility] OFFICE).

## 2025-05-30 NOTE — THERAPY TREATMENT NOTE
"Subjective: Pt agreeable to therapeutic plan of care. Pt self reports being in a bad mood as he usually smokes and drinks daily at home. Pt ate breakfast this AM not knowing he was supposed to be NPO for BAHMAN.     Objective:     Precautions - seizures    Bed mobility - SBA  Transfers - CGA, Min-A, and with rolling walker  Ambulation - 25 feet Min-A and with rolling walker    Vitals: Hypertensive  /108    Pain: 0 VAS     Education: Verbal/Tactile Cues, Transfer Training, and Gait Training    Assessment: Tyson Rosario presents with functional mobility impairments which indicate the need for skilled intervention. MRI on arrival to [facility] shows \"Multiple areas of acute infarct are present likely within a right MCA distribution with prominent perirolandic and right temporal lobe involvement. Pt was able to lightly grasp rolling walker with left hand today which improved his steadiness with gait. Pt was hypertensive - RN aware. PT cued pt for equal step length BLE and improved heel strike at initial contact. Pt has decreased righting reactions in standing and thus he is at high risk for falls. Tolerating session today without incident. Will continue to follow and progress as tolerated.     Plan/Recommendations:   If medically appropriate, High Intensity Therapy recommended post-acute care. This is recommended as therapy feels the patient would require 5-6 days per week, 2-3 hours per day. At this time, inpatient rehabilitation (acute rehab) would be the first choice and SNF would be second. Pt requires no DME at discharge.     Pt desires Skilled Rehab placement at discharge. Pt cooperative; agreeable to therapeutic recommendations and plan of care.         Basic Mobility 6-click:  Rollin = Total, A lot = 2, A little = 3; 4 = None  Supine>Sit:   1 = Total, A lot = 2, A little = 3; 4 = None   Sit>Stand with arms:  1 = Total, A lot = 2, A little = 3; 4 = None  Bed>Chair:   1 = Total, A lot = 2, A little = 3; 4 = " None  Ambulate in room:  1 = Total, A lot = 2, A little = 3; 4 = None  3-5 Steps with railin = Total, A lot = 2, A little = 3; 4 = None  Score: 18    Modified Cliff: 4 = Moderately severe disability (Unable to attend to own bodily needs without assistance, and unable to walk unassisted)     Post-Tx Position: Up in Chair, Staff Present, Alarms activated, and Call light and personal items within reach  PPE: gloves and surgical mask    Therapy Charges for Today       Code Description Service Date Service Provider Modifiers Qty    83866528855 HC PT EVAL MOD COMPLEXITY 4 2025 Liv Licona, PT GP 1    [id number] HC GAIT TRAINING EA 15 MIN 2025 Liv Licona, PT GP 1           PT Charges       Row Name 25 1239             Time Calculation    Start Time 0945  -BR      Stop Time 0957  -BR      Time Calculation (min) 12 min  -BR      PT Received On 25  -BR      PT - Next Appointment 25  -BR         Time Calculation- PT    Total Timed Code Minutes- PT 12 minute(s)  -BR                User Key  (r) = Recorded By, (t) = Taken By, (c) = Cosigned By      Initials Name Provider Type    BR Liv Licona, PT Physical Therapist

## 2025-05-30 NOTE — THERAPY TREATMENT NOTE
Subjective: Pt agreeable to therapeutic plan of care. Pt upset this date, reporting he normally smokes 1-2 packs/day and drinks a fifth of bourbon daily. He was supposed to get a BAHMAN but no one relayed this to him and he was allowed to eat. This has added irritation.   Cognition: oriented to Person, Place, Time, and Situation    Objective:     Precautions - seizures    Bed Mobility: SBA   Functional Transfers: Min-A and with rolling walker     Balance: supported, static, dynamic, and standing Min-A and with rolling walker  Functional Ambulation: Min-A and with rolling walker - frequent cues to tend to left hand and assist with maintaining it on the walker.     Vitals: Hypertensive 173/108    Pain: 3 Hurley-Aranda Location: General  Interventions for pain: Repositioned, Increased Activity, and Therapeutic Presence  Education: Provided education on the importance of mobility in the acute care setting, Verbal/Tactile Cues, ADL training, and Transfer Training    Assessment: Tyson Rosario presents with ADL impairments affecting function including balance, coordination, endurance / activity tolerance, grasp, postural / trunk control, range of motion (ROM), and strength. Pt frustrated this date but willing to get OOB and participate in therapy. Improved ability to grasp the RW noted this date, though still having difficulty with left hand slipping from hand  many times. Encouraged pt to exercise the LUE and use for functional tasks frequently. He continues to require assist with ADL care secondary to LUE weakness. Demonstrated functioning below baseline abilities indicate the need for continued skilled intervention while inpatient. Tolerating session today without incident. Will continue to follow and progress as tolerated.     Plan/Recommendations:   High Intensity Therapy recommended post-acute care. This is recommended as therapy feels the patient would require 5-6 days per week, 2-3 hours per day. At this time,  inpatient rehabilitation (acute rehab) would be the first choice and SNF would be second.. Pt requires no DME at discharge.     Pt desires Inpatient Rehabilitation placement at discharge. Pt cooperative; agreeable to therapeutic recommendations and plan of care.     Modified Cliff: 4 = Moderately severe disability (Unable to attend to own bodily needs without assistance, and unable to walk unassisted)     Post-Tx Position: Up in Chair, Alarms activated, and Call light and personal items within reach  PPE: gloves    Therapy Charges for Today       Code Description Service Date Service Provider Modifiers Qty    [id number]  OT EVAL MOD COMPLEXITY 4 5/29/2025 Ferny Lakhani OT GO 1    [id number]  OT THERAPEUTIC ACT EA 15 MIN 5/30/2025 Ferny Lakhani OT GO 1           Time Calculation- OT       Row Name 05/30/25 1304             Time Calculation- OT    OT Start Time 0945  -LS      OT Stop Time 0958  -      OT Time Calculation (min) 13 min  -LS      Total Timed Code Minutes- OT 13 minute(s)  -      OT Received On 05/30/25  -      OT - Next Appointment 06/02/25  -         Timed Charges    75842 - OT Therapeutic Activity Minutes 13  -LS         Total Minutes    Timed Charges Total Minutes 13  -LS       Total Minutes 13  -LS                User Key  (r) = Recorded By, (t) = Taken By, (c) = Cosigned By      Initials Name Provider Type    Ferny Nguyen OT Occupational Therapist

## 2025-05-30 NOTE — PROGRESS NOTES
LOS: 1 day     History taken from: patient chart family RN    Chief complaint: Left side weakness     SUBJECTIVE:    Interval History:  Pt upset and wants to go outside and have a cigarette. He is agitated from the alcohol withdrawal. He is still having left sided weakness.         Review of Systems   Eyes:  Negative for visual disturbance.   Cardiovascular: Negative.    Neurological:  Positive for speech difficulty, weakness and numbness. Negative for dizziness, tremors, seizures, syncope, facial asymmetry, light-headedness and headaches.   Psychiatric/Behavioral:  Positive for agitation and sleep disturbance. The patient is nervous/anxious.           Pertinent PMH:  has a past medical history of CHF (congestive heart failure) and Lung trauma.     ________________________________________________     OBJECTIVE:    On exam:  GENERAL: NAD  CARDIO: RRR  NEURO:  Drowsy but does wake up to voice   Oriented x3  EOMI, PERRL, no visual field deficits  Left  facial asymmetry  Dysarthria  Numbness left face and left arm/hand   Strength 5-/5 proximal, 4/5 distal LUE  No ataxia          ________________________________________________   RESULTS REVIEW    VITAL SIGNS:  Temp:  [97.2 °F (36.2 °C)-98.2 °F (36.8 °C)] 97.3 °F (36.3 °C)  Heart Rate:  [84-96] 84  Resp:  [14-24] 21  BP: (150-160)/() 155/96    LABS:       Lab 05/30/25 0215 05/29/25 0223   WBC 4.77  --    HEMOGLOBIN 15.9  --    HEMATOCRIT 44.9  --    PLATELETS 111*  --    .4*  --    SED RATE  --  <1         Lab 05/30/25 0215 05/29/25  1406 05/29/25 0223   SODIUM 141  --  142   POTASSIUM 4.0 3.6 3.4*   CHLORIDE 107  --  99   CO2 25.6  --  23.9   ANION GAP 8.4  --  19.1*   BUN 7.6  --  6.1   CREATININE 0.64*  --  0.65*   EGFR 111.1  --  110.6   GLUCOSE 116*  --  93   CALCIUM 8.9  --  8.9   HEMOGLOBIN A1C  --   --  5.29   TSH  --   --  3.080         Lab 05/29/25  0419   TOTAL PROTEIN 6.0   ALBUMIN 3.9   ALT (SGPT) 25   AST (SGOT) 39   BILIRUBIN 0.9    INDIRECT BILIRUBIN 0.6   BILIRUBIN DIRECT 0.3   ALK PHOS 108         Lab 05/29/25  0419 05/29/25  0223   HSTROP T 168* 163*         Lab 05/29/25  0223   CHOLESTEROL 194   LDL CHOL 122*   HDL CHOL 53   TRIGLYCERIDES 107         Lab 05/29/25  1406   VITAMIN B 12 <150*             Lab Results   Component Value Date    TSH 3.080 05/29/2025     (H) 05/29/2025    HGBA1C 5.29 05/29/2025    [id number] <150 (L) 05/29/2025         IMAGING STUDIES:  MRI Brain With & Without Contrast  Result Date: 5/30/2025  Impression: No significant short interval acute change. Redemonstrated and stable appearing multifocal infarcts within the right MCA territory, without evidence of new ischemia, worsening mass effect or hemorrhage. These areas demonstrate some intrinsic cortical T1 shortening and enhancement suggesting laminar necrosis. The predominance of diffusion restriction and vascular distribution of findings strongly indicates ischemic etiology, however if there is sufficient clinical concern for intracranial infectious process, concurrent encephalitis is difficult to entirely exclude on imaging. Electronically Signed: Martin Shearer MD  5/30/2025 6:45 AM EDT  Workstation ID: [id number]    CT Chest With Contrast Diagnostic  Result Date: 5/29/2025  1. No findings of malignancy in the chest, abdomen, and pelvis 2. Calcific bilateral pleural thickening likely related to remote hemothorax or empyema. Multiple old bilateral rib fractures are noted 3. Coronary artery atherosclerosis 4. Aortic valve calcification which can be associated with valvular aortic stenosis 5. Steatosis of the left hepatic lobe 6. Cholelithiasis with findings suggesting chronic gallbladder disease Electronically Signed: Anoop Ashford  5/29/2025 3:10 PM EDT  Workstation ID: [id number]    CT Abdomen Pelvis With Contrast  Result Date: 5/29/2025  1. No findings of malignancy in the chest, abdomen, and pelvis 2. Calcific bilateral pleural thickening likely related  to remote hemothorax or empyema. Multiple old bilateral rib fractures are noted 3. Coronary artery atherosclerosis 4. Aortic valve calcification which can be associated with valvular aortic stenosis 5. Steatosis of the left hepatic lobe 6. Cholelithiasis with findings suggesting chronic gallbladder disease Electronically Signed: Anoop Ashford  5/29/2025 3:10 PM EDT  Workstation ID: [id number]    MRI Brain Without Contrast  Result Date: 5/29/2025  Impression: Multiple areas of acute infarct are present likely within a right MCA distribution with prominent perirolandic and right temporal lobe involvement. There is no evidence of associated mass effect or hemorrhage. Electronically Signed: Martin Shearer MD  5/29/2025 6:38 AM EDT  Workstation ID: [id number]      I reviewed the patient's new clinical results.    ________________________________________________      PROBLEM LIST:    Stroke        ASSESSMENT/PLAN:    Acute to subacute strokes within the right MCA territory incoling right temporal lobe and right pre and postcentral cyri. Strokes appear embolic.   CT head: No hemorrhage   MRI brain: Reviewed   CTA head and neck: The read is the only available from outlying facility reads as moderate stenosis of the right ICA.  Echo: EF is 56-60%  EKG (5/28) sinus rhythm, rate 100  Labs: A1C: 5.29, B12: <150 LDL: 122, TSH: 3.080  Antithrombotics: Cont. DAPT for now   Statin: Awaiting LFT  - PT/OT/ST as appropriate, Neuro checks per protocol, DVT prophylaxis, Stroke education  - Cardiology consult for work-up: BAHMAN and event monitor for 30 days recommended (rule out atrial fibrillation/paroxysmal atrial fibrillation, atrial septal or left ventricular aneurysm, patent carty ovale, thrombus, atheroma, etc.)  -CTA chest, abdomen and pelvis ok without any malignancy      Modification of stroke risk factors:   - Blood pressure should be less than 130/80 outpatient, HbA1c less than 6.5, LDL less than 70; b12>500 and smoking cessation  if applicable. We would be grateful if the primary team / primary care physician would keep a close watch on the above targets.  - Stroke education  - Follow up with stroke clinic (referral placed)      2.  Hyperlipidemia  - Statin & dietary modifications     3.  Tobacco abuse-90+ pack years-discussed importance of smoking cessation     4.  EtOH abuse  - Patient states he drinks 1/5 bottle of liquor daily  CIWA per protocol     5.  Hypertension  - Continue home medications  - BP goal 130/90, avoid hypotension     Plan      Manage blood pressure-goal normotensive  DAPT  Cardiology work up   Awaiting BAHMAN       Will follow up on BAHMAN        I discussed the patient's findings and my recommendations with patient, family, and nursing staff    JOCELINE Giles  05/30/25  09:18 EDT

## 2025-05-31 LAB
ANION GAP SERPL CALCULATED.3IONS-SCNC: 9.3 MMOL/L (ref 5–15)
BUN SERPL-MCNC: 8.9 MG/DL (ref 6–20)
BUN/CREAT SERPL: 12.5 (ref 7–25)
CALCIUM SPEC-SCNC: 8.9 MG/DL (ref 8.6–10.5)
CHLORIDE SERPL-SCNC: 102 MMOL/L (ref 98–107)
CO2 SERPL-SCNC: 25.7 MMOL/L (ref 22–29)
CREAT SERPL-MCNC: 0.71 MG/DL (ref 0.76–1.27)
DEPRECATED RDW RBC AUTO: 59 FL (ref 37–54)
EGFRCR SERPLBLD CKD-EPI 2021: 107.7 ML/MIN/1.73
ERYTHROCYTE [DISTWIDTH] IN BLOOD BY AUTOMATED COUNT: 14 % (ref 12.3–15.4)
GLUCOSE SERPL-MCNC: 117 MG/DL (ref 65–99)
HCT VFR BLD AUTO: 43.8 % (ref 37.5–51)
HGB BLD-MCNC: 14.9 G/DL (ref 13–17.7)
MCH RBC QN AUTO: 39 PG (ref 26.6–33)
MCHC RBC AUTO-ENTMCNC: 34 G/DL (ref 31.5–35.7)
MCV RBC AUTO: 114.7 FL (ref 79–97)
PLATELET # BLD AUTO: 115 10*3/MM3 (ref 140–450)
PMV BLD AUTO: 10.3 FL (ref 6–12)
POTASSIUM SERPL-SCNC: 3.8 MMOL/L (ref 3.5–5.2)
RBC # BLD AUTO: 3.82 10*6/MM3 (ref 4.14–5.8)
SODIUM SERPL-SCNC: 137 MMOL/L (ref 136–145)
WBC NRBC COR # BLD AUTO: 5.75 10*3/MM3 (ref 3.4–10.8)

## 2025-05-31 PROCEDURE — 85027 COMPLETE CBC AUTOMATED: CPT

## 2025-05-31 PROCEDURE — 25010000002 FOLIC ACID 5 MG/ML SOLUTION 10 ML VIAL

## 2025-05-31 PROCEDURE — 25010000002 THIAMINE PER 100 MG

## 2025-05-31 PROCEDURE — 99232 SBSQ HOSP IP/OBS MODERATE 35: CPT | Performed by: INTERNAL MEDICINE

## 2025-05-31 PROCEDURE — 25010000002 DIAZEPAM PER 5 MG

## 2025-05-31 PROCEDURE — 80048 BASIC METABOLIC PNL TOTAL CA: CPT

## 2025-05-31 PROCEDURE — 25010000002 CYANOCOBALAMIN PER 1000 MCG: Performed by: NURSE PRACTITIONER

## 2025-05-31 RX ORDER — OXYCODONE HYDROCHLORIDE 5 MG/1
5 TABLET ORAL ONCE
Refills: 0 | Status: COMPLETED | OUTPATIENT
Start: 2025-05-31 | End: 2025-05-31

## 2025-05-31 RX ADMIN — THIAMINE HYDROCHLORIDE 200 MG: 100 INJECTION, SOLUTION INTRAMUSCULAR; INTRAVENOUS at 20:28

## 2025-05-31 RX ADMIN — DIAZEPAM 20 MG: 10 INJECTION, SOLUTION INTRAMUSCULAR; INTRAVENOUS at 20:28

## 2025-05-31 RX ADMIN — ASPIRIN 325 MG ORAL TABLET 325 MG: 325 PILL ORAL at 11:08

## 2025-05-31 RX ADMIN — DIAZEPAM 20 MG: 10 INJECTION, SOLUTION INTRAMUSCULAR; INTRAVENOUS at 02:42

## 2025-05-31 RX ADMIN — AMLODIPINE BESYLATE 5 MG: 5 TABLET ORAL at 11:08

## 2025-05-31 RX ADMIN — DIAZEPAM 20 MG: 10 INJECTION, SOLUTION INTRAMUSCULAR; INTRAVENOUS at 05:50

## 2025-05-31 RX ADMIN — CYANOCOBALAMIN 1000 MCG: 1000 INJECTION, SOLUTION INTRAMUSCULAR; SUBCUTANEOUS at 11:09

## 2025-05-31 RX ADMIN — OXYCODONE 5 MG: 5 TABLET ORAL at 05:49

## 2025-05-31 RX ADMIN — THIAMINE HYDROCHLORIDE 200 MG: 100 INJECTION, SOLUTION INTRAMUSCULAR; INTRAVENOUS at 05:49

## 2025-05-31 RX ADMIN — LOSARTAN POTASSIUM 12.5 MG: 25 TABLET, FILM COATED ORAL at 11:07

## 2025-05-31 RX ADMIN — THIAMINE HYDROCHLORIDE 200 MG: 100 INJECTION, SOLUTION INTRAMUSCULAR; INTRAVENOUS at 16:33

## 2025-05-31 RX ADMIN — FUROSEMIDE 20 MG: 20 TABLET ORAL at 11:08

## 2025-05-31 RX ADMIN — DIAZEPAM 20 MG: 10 INJECTION, SOLUTION INTRAMUSCULAR; INTRAVENOUS at 16:33

## 2025-05-31 RX ADMIN — CLOPIDOGREL BISULFATE 75 MG: 75 TABLET, FILM COATED ORAL at 11:07

## 2025-05-31 RX ADMIN — DIAZEPAM 15 MG: 10 INJECTION, SOLUTION INTRAMUSCULAR; INTRAVENOUS at 11:18

## 2025-05-31 RX ADMIN — FOLIC ACID 1 MG: 5 INJECTION, SOLUTION INTRAMUSCULAR; INTRAVENOUS; SUBCUTANEOUS at 11:26

## 2025-05-31 RX ADMIN — NICOTINE 1 PATCH: 21 PATCH TRANSDERMAL at 11:01

## 2025-05-31 RX ADMIN — DIAZEPAM 20 MG: 10 INJECTION, SOLUTION INTRAMUSCULAR; INTRAVENOUS at 23:04

## 2025-05-31 RX ADMIN — DIAZEPAM 15 MG: 10 INJECTION, SOLUTION INTRAMUSCULAR; INTRAVENOUS at 03:52

## 2025-05-31 RX ADMIN — ATORVASTATIN CALCIUM 80 MG: 40 TABLET, FILM COATED ORAL at 20:28

## 2025-05-31 NOTE — PROGRESS NOTES
CARDIOLOGY PROGRESS NOTE:    Tyson Rosario  54 y.o.  male  1970  [id number]      Referring Provider: Hospitalist    Reason for follow-up: Stroke     Patient Care Team:  Lisa Vines MD as PCP - General (Family Medicine)    Subjective .  Patient does not have any symptoms today    Objective sitting in chair comfortably and wants to have a cigarette     Review of Systems   Constitutional: Negative for malaise/fatigue.   Cardiovascular:  Negative for chest pain, dyspnea on exertion, leg swelling and palpitations.   Respiratory:  Negative for cough and shortness of breath.    Gastrointestinal:  Negative for abdominal pain, nausea and vomiting.   Neurological:  Negative for dizziness, headaches, light-headedness, numbness and weakness.   All other systems reviewed and are negative.      Allergies: Patient has no known allergies.    Scheduled Meds:amLODIPine, 5 mg, Oral, Q24H  aspirin, 325 mg, Oral, Daily   Or  aspirin, 300 mg, Rectal, Daily  atorvastatin, 80 mg, Oral, Nightly  clopidogrel, 75 mg, Oral, Daily  cyanocobalamin, 1,000 mcg, Intramuscular, Q7 Days  folic acid 1 mg in sodium chloride 0.9 % 50 mL IVPB, 1 mg, Intravenous, Daily  furosemide, 20 mg, Oral, Daily  losartan, 12.5 mg, Oral, Q24H  nicotine, 1 patch, Transdermal, Q24H  thiamine (B-1) IV, 200 mg, Intravenous, Q8H   Followed by  [START ON 6/3/2025] thiamine, 100 mg, Oral, Daily      Continuous Infusions:niCARdipine, 5-15 mg/hr, Last Rate: Stopped (05/29/25 0349)      PRN Meds:.  acetaminophen **OR** acetaminophen **OR** acetaminophen    senna-docusate sodium **AND** polyethylene glycol **AND** bisacodyl **AND** bisacodyl    Calcium Replacement - Follow Nurse / BPA Driven Protocol    diazePAM **OR** diazePAM **OR** diazePAM **OR** diazePAM **OR** diazePAM **OR** diazePAM    hydrALAZINE    Magnesium Standard Dose Replacement - Follow Nurse / BPA Driven Protocol    melatonin    nitroglycerin    ondansetron    Phosphorus Replacement - Follow Nurse / BPA  "Driven Protocol    Potassium Replacement - Follow Nurse / BPA Driven Protocol        VITAL SIGNS  Vitals:    05/30/25 2140 05/30/25 2142 05/31/25 0239 05/31/25 1100   BP:   155/93 162/97   BP Location:   Right arm Left arm   Patient Position:   Sitting Lying   Pulse: 103 105  94   Resp:    18   Temp:   97.7 °F (36.5 °C)    TempSrc:   Oral    SpO2: 95% 96%     Weight:       Height:           Flowsheet Rows      Flowsheet Row First Filed Value   Admission Height 182.9 cm (72\") Documented at 05/29/2025 0100   Admission Weight 97 kg (213 lb 13.5 oz) Documented at 05/29/2025 0100             TELEMETRY: Sinus rhythm    Physical Exam:  Constitutional:       Appearance: Well-developed.   Eyes:      General: No scleral icterus.     Conjunctiva/sclera: Conjunctivae normal.   HENT:      Head: Normocephalic and atraumatic.   Neck:      Vascular: No carotid bruit or JVD.   Pulmonary:      Effort: Pulmonary effort is normal.      Breath sounds: Normal breath sounds. No wheezing. No rales.   Cardiovascular:      Normal rate. Regular rhythm.   Pulses:     Intact distal pulses.   Abdominal:      General: Bowel sounds are normal.      Palpations: Abdomen is soft.   Musculoskeletal:      Cervical back: Normal range of motion and neck supple. Skin:     General: Skin is warm and dry.      Findings: No rash.   Neurological:      Mental Status: Alert.          Results Review:   I reviewed the patient's new clinical results.  Lab Results (last 24 hours)       Procedure Component Value Units Date/Time    Basic Metabolic Panel [[id number]]  (Abnormal) Collected: 05/31/25 0147    Specimen: Blood from Arm, Right Updated: 05/31/25 0232     Glucose 117 mg/dL      BUN 8.9 mg/dL      Creatinine 0.71 mg/dL      Sodium 137 mmol/L      Potassium 3.8 mmol/L      Chloride 102 mmol/L      CO2 25.7 mmol/L      Calcium 8.9 mg/dL      BUN/Creatinine Ratio 12.5     Anion Gap 9.3 mmol/L      eGFR 107.7 mL/min/1.73     Narrative:      GFR Categories in Chronic " Kidney Disease (CKD)              GFR Category          GFR (mL/min/1.73)    Interpretation  G1                    90 or greater        Normal or high (1)  G2                    60-89                Mild decrease (1)  G3a                   45-59                Mild to moderate decrease  G3b                   30-44                Moderate to severe decrease  G4                    15-29                Severe decrease  G5                    14 or less           Kidney failure    (1)In the absence of evidence of kidney disease, neither GFR category G1 or G2 fulfill the criteria for CKD.    eGFR calculation 2021 CKD-EPI creatinine equation, which does not include race as a factor    CBC (No Diff) [[id number]]  (Abnormal) Collected: 05/31/25 0147    Specimen: Blood from Arm, Right Updated: 05/31/25 0217     WBC 5.75 10*3/mm3      RBC 3.82 10*6/mm3      Hemoglobin 14.9 g/dL      Hematocrit 43.8 %      .7 fL      MCH 39.0 pg      MCHC 34.0 g/dL      RDW 14.0 %      RDW-SD 59.0 fl      MPV 10.3 fL      Platelets 115 10*3/mm3             Imaging Results (Last 24 Hours)       ** No results found for the last 24 hours. **            EKG      I personally viewed and interpreted the patient's EKG/Telemetry data:    ECHOCARDIOGRAM:  Results for orders placed during the hospital encounter of 05/29/25    Adult Transthoracic Echo Complete W/ Cont if Necessary Per Protocol (With Agitated Saline)    Interpretation Summary    Left ventricular systolic function is normal. Left ventricular ejection fraction appears to be 56 - 60%.    Left ventricular diastolic function was normal.    Saline test results are negative.       STRESS MYOVIEW:       CARDIAC CATHETERIZATION:  Results for orders placed during the hospital encounter of 03/17/20    Cardiac Catheterization/Vascular Study    Narrative  CARDIAC CATHETERIZATION REPORT      DATE OF PROCEDURE:  3/17/2020    INDICATION FOR PROCEDURE:    Abnormal stress test  Chest pain  Shortness  of breath    PROCEDURE PERFORMED:    Left heart catheterization  coronary angiography  left ventriculography    PROCEDURE COMMENTS:    After informed consent was obtained, the patient was prepped and draped in the usual sterile manner.  Mild to moderate sedation was administered.  Right femoral artery was accessed without difficulty and 6 Chadian arterial sheath was inserted.  Sheath was flushed with heparinized saline.    Using 6 Chadian Kylah catheters, first left coronary artery and the right coronary was electively engaged and appropriate views were taken.  A 6 Chadian JR4 catheter was used to cross aortic valve and left heart catheterization was performed.  Left ventriculography was done in a review.    Patient tolerated the procedure well.    FINDINGS:    1. HEMODYNAMICS:    Aortic pressure: 122/65 mmHg    LVEDP: 5 mmHg    Gradient across aortic valve on pullback: No gradient across aortic valve      2. LEFT VENTRICULOGRAPHY: 55%      3. CORONARY ANGIOGRAPHY:    A: Left main coronary artery: Normal    B: Left anterior descending artery: Minimal plaquing noted in proximal and mid LAD.  No high-grade stenosis    C: Left circumflex coronary artery: 10% plaquing in the mid LCx    D: Right coronary artery: 20 to 25% plaque in the proximal segment of RCA.  RCA is dominant vessel    SUMMARY:    Minimal coronary artery disease  Preserved left ventricular function    RECOMMENDATIONS:    Medical treatment       OTHER:         Assessment & Plan     Acute stroke  Patient presented with acute stroke in the right MCA territory and is seen by neurologist and had an echocardiogram which showed normal LV systolic function  Patient also had an MRI of the brain and CT of the head which showed moderate stenosis of the right internal carotid artery and is on medical therapy  Patient will have a BAHMAN as an outpatient.    Coronary artery disease  Patient had border elevated troponin which could be secondary to hypertensive emergency  but only has nonapproved disease in the past and will have an outpatient workup also    Hypertension  Patient blood pressure is very high initially and he is currently on amlodipine losartan.    Hyperlipidemia  Patient is on high-dose statins now.        Ezequiel Kauffman MD  05/31/25  13:24 EDT

## 2025-05-31 NOTE — CASE MANAGEMENT/SOCIAL WORK
Continued Stay Note   [place]     Patient Name: Tyson Rosario  MRN: [id number]  Today's Date: 5/31/2025    Admit Date: 5/29/2025    Plan: DC Plan: [facility], skilled - accepted. Precert not required. PASRR QFR.   Discharge Plan       Row Name 05/31/25 1901       Plan    Plan DC Plan: [facility], skilled - accepted. Precert not required. PASRR QFR.    Plan Comments CM received SC from nurse stating that patient's wife was notified that he was accepted and was checking on the discharge process. CM messaged Amisha El with [facility] regarding bed availability. Confirmed that patient was accepted and a pre-cert was not required. However, stated that patient needs PASRR and LOC. SW notified. Likely to be in review until Monday. Facility and nursing notified. Facility stated bed will be available when PASRR/LOC is completed.                  Neelam Saldaña RN    Office: [phone]  Fax: [phone]

## 2025-05-31 NOTE — NURSING NOTE
Patient refusing heart monitor for this entire shift. Patient would allow nurse to periodically check vitals and perform assessments, but would not keep leads or pulse ox on. Patient also refused bed and chair alarms for shift. Spouse remained at bedside.

## 2025-05-31 NOTE — PROGRESS NOTES
.    [facility] MEDICINE SERVICE  DAILY PROGRESS NOTE    NAME: Tyson Rosario  : 1970  MRN: [id number]      LOS: 2 days     PROVIDER OF SERVICE: Kevin Thorne MD    Chief Complaint: Stroke    Subjective:     Interval History:  History taken from: patient    Seen and examined at bedside, wife also present.  He keeps threatening to leave to go smoke a cigarette, says he has been tolerating his diet and ambulating to the restroom without any difficulty.        Review of Systems:   Review of Systems negative except as mentioned above    Objective:     Vital Signs  Temp:  [97.1 °F (36.2 °C)-97.7 °F (36.5 °C)] 97.7 °F (36.5 °C)  Heart Rate:  [] 94  Resp:  [18-20] 18  BP: (155-169)/() 162/97   Body mass index is 28.89 kg/m².    Physical Exam  Physical Exam  Constitutional:       Appearance: Normal appearance.   Cardiovascular:      Rate and Rhythm: Normal rate and regular rhythm.   Pulmonary:      Effort: Pulmonary effort is normal.   Musculoskeletal:      Left hand: Edema present.   Skin:     General: Skin is warm and dry.   Neurological:      General: No focal deficit present.      Mental Status: He is alert and oriented to person, place, and time. Mental status is at baseline.      Motor: Weakness (Left ) present.   Psychiatric:         Mood and Affect: Mood normal.         Behavior: Behavior normal.      Diagnostic Data    Results from last 7 days   Lab Units 25  0147 25  1406 25  0419   WBC 10*3/mm3 5.75   < >  --    HEMOGLOBIN g/dL 14.9   < >  --    HEMATOCRIT % 43.8   < >  --    PLATELETS 10*3/mm3 115*   < >  --    GLUCOSE mg/dL 117*   < >  --    CREATININE mg/dL 0.71*   < >  --    BUN mg/dL 8.9   < >  --    SODIUM mmol/L 137   < >  --    POTASSIUM mmol/L 3.8   < >  --    AST (SGOT) U/L  --   --  39   ALT (SGPT) U/L  --   --  25   ALK PHOS U/L  --   --  108   BILIRUBIN mg/dL  --   --  0.9   ANION GAP mmol/L 9.3   < >  --     < > = values in this interval not displayed.        MRI Brain With & Without Contrast  Result Date: 5/30/2025  Impression: No significant short interval acute change. Redemonstrated and stable appearing multifocal infarcts within the right MCA territory, without evidence of new ischemia, worsening mass effect or hemorrhage. These areas demonstrate some intrinsic cortical T1 shortening and enhancement suggesting laminar necrosis. The predominance of diffusion restriction and vascular distribution of findings strongly indicates ischemic etiology, however if there is sufficient clinical concern for intracranial infectious process, concurrent encephalitis is difficult to entirely exclude on imaging. Electronically Signed: Martin Shearer MD  5/30/2025 6:45 AM EDT  Workstation ID: [id number]    CT Chest With Contrast Diagnostic  Result Date: 5/29/2025  1. No findings of malignancy in the chest, abdomen, and pelvis 2. Calcific bilateral pleural thickening likely related to remote hemothorax or empyema. Multiple old bilateral rib fractures are noted 3. Coronary artery atherosclerosis 4. Aortic valve calcification which can be associated with valvular aortic stenosis 5. Steatosis of the left hepatic lobe 6. Cholelithiasis with findings suggesting chronic gallbladder disease Electronically Signed: Anoop Ashford  5/29/2025 3:10 PM EDT  Workstation ID: [id number]    CT Abdomen Pelvis With Contrast  Result Date: 5/29/2025  1. No findings of malignancy in the chest, abdomen, and pelvis 2. Calcific bilateral pleural thickening likely related to remote hemothorax or empyema. Multiple old bilateral rib fractures are noted 3. Coronary artery atherosclerosis 4. Aortic valve calcification which can be associated with valvular aortic stenosis 5. Steatosis of the left hepatic lobe 6. Cholelithiasis with findings suggesting chronic gallbladder disease Electronically Signed: Anoop Ashford  5/29/2025 3:10 PM EDT  Workstation ID: [id number]        I reviewed the patient's new clinical  results.    Assessment/Plan:     Active and Resolved Problems  Active Hospital Problems    Diagnosis  POA    **Stroke [I63.9]  Yes      Resolved Hospital Problems   No resolved problems to display.       Acute to subacute strokes within the right MCA territory incoling right temporal lobe and right pre and postcentral cyri. Strokes appear embolic.   -Last known normal 5/25/25  -CT of the head showed no acute process  -CTA of the head and neck showed an age indeterminate infarct in the right parietal and temporal lobes and moderate plaque in the right ICA  -Aspirin ordered  -Lipitor ordered  -Neurochecks ordered  -Lipid panel, A1C, TSH, ESR, Folate and B12 labs ordered  -PT/OT consults  -MRI of the brain reviewed  -2D echo ordered  -Neurology following, recommend DAPT for now.     Essential Hypertension  -Noncompliant with medications  -Uncontrolled, initial BP was 200/130  -Improved with IV Hydralazine  -Restart home medications     Alcohol Abuse  Tobacco Abuse  -Reports drinking 1/5 of [place] daily  -Last drink on Tuesday, 5/27/25  -Alcohol withdrawal protocol ordered  -Thiamine and Folic Acid replacement  -Electrolyte replacement protocol ordered  -Seizure, Safety precautions  -Nicotine patch ordered     Elevated Troponin  -Possibly due to chronically uncontrolled HTN  -Troponin at outside facility 288, 290  -Trend Troponin here  -Denies Chest pain  -EKG showed SR  -Continuous cardiac monitoring  - Cardiology following, planning for BAHMAN and possible ischemic evaluation    VTE Prophylaxis:  Mechanical VTE prophylaxis orders are present.             Disposition Planning:     Barriers to Discharge: BAHMAN, alcohol withdrawal  Anticipated Date of Discharge: 6/3  Place of Discharge: Rehab      Time: 35 minutes     Code Status and Medical Interventions: CPR (Attempt to Resuscitate); Full Support   Ordered at: 05/29/25 0119     Code Status (Patient has no pulse and is not breathing):    CPR (Attempt to Resuscitate)      Medical Interventions (Patient has pulse or is breathing):    Full Support       Signature: Electronically signed by Kevin Thorne MD, 05/31/25, 12:13 EDT.  [facility]

## 2025-05-31 NOTE — PLAN OF CARE
Goal Outcome Evaluation:         Patient has bouts of restlessness, anxiety, and agitation. CIWA scores charted on flowshets. MD paged and notified of patient complaints and symptoms.      Wife at the bedside and has been very helpful with patient's care. Patient been walking around the hallway, walking to the bathroom. PRN valium given per CIWA.    Patient currently resting in bed. Refuses bed alarm and heart monitor at this time.

## 2025-05-31 NOTE — PLAN OF CARE
Problem: Comorbidity Management  Goal: Maintenance of Asthma Control  Outcome: Progressing  Goal: Maintenance of Behavioral Health Symptom Control  Outcome: Progressing  Goal: Maintenance of COPD Symptom Control  Outcome: Progressing  Goal: Blood Glucose Level Within Target Range  Outcome: Progressing  Goal: Maintenance of Heart Failure Symptom Control  Outcome: Progressing  Goal: Blood Pressure in Desired Range  Outcome: Progressing  Goal: Maintenance of Osteoarthritis Symptom Control  Outcome: Progressing  Goal: Bariatric Home Regimen Maintained  Outcome: Progressing  Goal: Maintenance of Seizure Control  Outcome: Progressing     Problem: Violence Risk or Actual  Goal: Anger and Impulse Control  Outcome: Progressing     Problem: Fall Injury Risk  Goal: Absence of Fall and Fall-Related Injury  Outcome: Progressing   Goal Outcome Evaluation:

## 2025-06-01 LAB
DEPRECATED RDW RBC AUTO: 61.2 FL (ref 37–54)
ERYTHROCYTE [DISTWIDTH] IN BLOOD BY AUTOMATED COUNT: 14.2 % (ref 12.3–15.4)
HCT VFR BLD AUTO: 47.9 % (ref 37.5–51)
HGB BLD-MCNC: 16.4 G/DL (ref 13–17.7)
MCH RBC QN AUTO: 39.6 PG (ref 26.6–33)
MCHC RBC AUTO-ENTMCNC: 34.2 G/DL (ref 31.5–35.7)
MCV RBC AUTO: 115.7 FL (ref 79–97)
PLATELET # BLD AUTO: 119 10*3/MM3 (ref 140–450)
PMV BLD AUTO: 11.5 FL (ref 6–12)
QT INTERVAL: 399 MS
QTC INTERVAL: 485 MS
RBC # BLD AUTO: 4.14 10*6/MM3 (ref 4.14–5.8)
WBC NRBC COR # BLD AUTO: 7 10*3/MM3 (ref 3.4–10.8)

## 2025-06-01 PROCEDURE — 99222 1ST HOSP IP/OBS MODERATE 55: CPT

## 2025-06-01 PROCEDURE — 25010000002 DIAZEPAM PER 5 MG

## 2025-06-01 PROCEDURE — 93010 ELECTROCARDIOGRAM REPORT: CPT | Performed by: INTERNAL MEDICINE

## 2025-06-01 PROCEDURE — 25010000002 FOLIC ACID 5 MG/ML SOLUTION 10 ML VIAL

## 2025-06-01 PROCEDURE — 93005 ELECTROCARDIOGRAM TRACING: CPT

## 2025-06-01 PROCEDURE — 85027 COMPLETE CBC AUTOMATED: CPT

## 2025-06-01 PROCEDURE — 99232 SBSQ HOSP IP/OBS MODERATE 35: CPT | Performed by: INTERNAL MEDICINE

## 2025-06-01 PROCEDURE — 25010000002 THIAMINE PER 100 MG

## 2025-06-01 RX ORDER — NALTREXONE HYDROCHLORIDE 50 MG/1
50 TABLET, FILM COATED ORAL DAILY
Status: DISCONTINUED | OUTPATIENT
Start: 2025-06-01 | End: 2025-06-02 | Stop reason: HOSPADM

## 2025-06-01 RX ADMIN — DIAZEPAM 15 MG: 10 INJECTION, SOLUTION INTRAMUSCULAR; INTRAVENOUS at 21:28

## 2025-06-01 RX ADMIN — DIAZEPAM 15 MG: 10 INJECTION, SOLUTION INTRAMUSCULAR; INTRAVENOUS at 18:01

## 2025-06-01 RX ADMIN — THIAMINE HYDROCHLORIDE 200 MG: 100 INJECTION, SOLUTION INTRAMUSCULAR; INTRAVENOUS at 16:24

## 2025-06-01 RX ADMIN — THIAMINE HYDROCHLORIDE 200 MG: 100 INJECTION, SOLUTION INTRAMUSCULAR; INTRAVENOUS at 21:28

## 2025-06-01 RX ADMIN — DIAZEPAM 20 MG: 10 INJECTION, SOLUTION INTRAMUSCULAR; INTRAVENOUS at 05:11

## 2025-06-01 RX ADMIN — DIAZEPAM 10 MG: 10 INJECTION, SOLUTION INTRAMUSCULAR; INTRAVENOUS at 03:41

## 2025-06-01 RX ADMIN — DIAZEPAM 10 MG: 10 INJECTION, SOLUTION INTRAMUSCULAR; INTRAVENOUS at 08:01

## 2025-06-01 RX ADMIN — DIAZEPAM 10 MG: 5 TABLET ORAL at 23:25

## 2025-06-01 RX ADMIN — ASPIRIN 325 MG ORAL TABLET 325 MG: 325 PILL ORAL at 08:09

## 2025-06-01 RX ADMIN — ATORVASTATIN CALCIUM 80 MG: 40 TABLET, FILM COATED ORAL at 21:28

## 2025-06-01 RX ADMIN — DIAZEPAM 20 MG: 10 INJECTION, SOLUTION INTRAMUSCULAR; INTRAVENOUS at 01:22

## 2025-06-01 RX ADMIN — DIAZEPAM 15 MG: 10 INJECTION, SOLUTION INTRAMUSCULAR; INTRAVENOUS at 13:13

## 2025-06-01 RX ADMIN — AMLODIPINE BESYLATE 5 MG: 5 TABLET ORAL at 08:09

## 2025-06-01 RX ADMIN — NICOTINE 1 PATCH: 21 PATCH TRANSDERMAL at 08:10

## 2025-06-01 RX ADMIN — FUROSEMIDE 20 MG: 20 TABLET ORAL at 08:09

## 2025-06-01 RX ADMIN — NALTREXONE HYDROCHLORIDE 50 MG: 50 TABLET, FILM COATED ORAL at 16:23

## 2025-06-01 RX ADMIN — LOSARTAN POTASSIUM 12.5 MG: 25 TABLET, FILM COATED ORAL at 08:08

## 2025-06-01 RX ADMIN — DIAZEPAM 15 MG: 10 INJECTION, SOLUTION INTRAMUSCULAR; INTRAVENOUS at 10:34

## 2025-06-01 RX ADMIN — THIAMINE HYDROCHLORIDE 200 MG: 100 INJECTION, SOLUTION INTRAMUSCULAR; INTRAVENOUS at 08:01

## 2025-06-01 RX ADMIN — CLOPIDOGREL BISULFATE 75 MG: 75 TABLET, FILM COATED ORAL at 08:09

## 2025-06-01 RX ADMIN — FOLIC ACID 1 MG: 5 INJECTION, SOLUTION INTRAMUSCULAR; INTRAVENOUS; SUBCUTANEOUS at 08:10

## 2025-06-01 NOTE — PLAN OF CARE
Problem: Comorbidity Management  Goal: Maintenance of Asthma Control  6/1/2025 1703 by Christi Layton RN  Outcome: Progressing  6/1/2025 1444 by Christi Layton RN  Outcome: Progressing  Intervention: Maintain Asthma Symptom Control  Recent Flowsheet Documentation  Taken 6/1/2025 1600 by Christi Layton RN  Medication Review/Management: medications reviewed  Taken 6/1/2025 1200 by Christi Layton RN  Medication Review/Management: medications reviewed  Taken 6/1/2025 0737 by Christi Layton RN  Medication Review/Management: medications reviewed  Goal: Maintenance of Behavioral Health Symptom Control  6/1/2025 1703 by Christi Layton RN  Outcome: Progressing  6/1/2025 1444 by Christi Layton RN  Outcome: Progressing  Intervention: Maintain Behavioral Health Symptom Control  Recent Flowsheet Documentation  Taken 6/1/2025 1600 by Christi Layton RN  Medication Review/Management: medications reviewed  Taken 6/1/2025 1200 by Christi Layton RN  Medication Review/Management: medications reviewed  Taken 6/1/2025 0737 by Christi Layton RN  Medication Review/Management: medications reviewed  Goal: Maintenance of COPD Symptom Control  6/1/2025 1703 by Christi Layton RN  Outcome: Progressing  6/1/2025 1444 by Christi Layton RN  Outcome: Progressing  Intervention: Maintain COPD (Chronic Obstructive Pulmonary Disease) Symptom Control  Recent Flowsheet Documentation  Taken 6/1/2025 1600 by Christi Layton RN  Medication Review/Management: medications reviewed  Taken 6/1/2025 1200 by Christi Layton RN  Medication Review/Management: medications reviewed  Taken 6/1/2025 0737 by Christi Layton RN  Medication Review/Management: medications reviewed  Goal: Blood Glucose Level Within Target Range  6/1/2025 1703 by Christi Layton RN  Outcome: Progressing  6/1/2025 1444 by Christi Layton RN  Outcome: Progressing  Intervention: Monitor and Manage Glycemia  Recent Flowsheet Documentation  Taken 6/1/2025 1600 by Christi Layton RN  Medication  Review/Management: medications reviewed  Taken 6/1/2025 1200 by Christi Layton RN  Medication Review/Management: medications reviewed  Taken 6/1/2025 0737 by Christi Layton RN  Medication Review/Management: medications reviewed  Goal: Maintenance of Heart Failure Symptom Control  6/1/2025 1703 by Christi Layton RN  Outcome: Progressing  6/1/2025 1444 by Christi Layton RN  Outcome: Progressing  Intervention: Maintain Heart Failure Management  Recent Flowsheet Documentation  Taken 6/1/2025 1600 by Christi Layton RN  Medication Review/Management: medications reviewed  Taken 6/1/2025 1200 by Christi Layton RN  Medication Review/Management: medications reviewed  Taken 6/1/2025 0737 by Christi Layton RN  Medication Review/Management: medications reviewed  Goal: Blood Pressure in Desired Range  6/1/2025 1703 by Christi Layton RN  Outcome: Progressing  6/1/2025 1444 by Christi Layton RN  Outcome: Progressing  Intervention: Maintain Blood Pressure Management  Recent Flowsheet Documentation  Taken 6/1/2025 1600 by Christi Layton RN  Medication Review/Management: medications reviewed  Taken 6/1/2025 1200 by Christi Layton RN  Medication Review/Management: medications reviewed  Taken 6/1/2025 0737 by Christi Layton RN  Medication Review/Management: medications reviewed  Goal: Maintenance of Osteoarthritis Symptom Control  6/1/2025 1703 by Christi Layton RN  Outcome: Progressing  6/1/2025 1444 by Christi Layton RN  Outcome: Progressing  Intervention: Maintain Osteoarthritis Symptom Control  Recent Flowsheet Documentation  Taken 6/1/2025 1600 by Christi Layton RN  Activity Management: ambulated in room  Medication Review/Management: medications reviewed  Taken 6/1/2025 1400 by Christi Layton RN  Activity Management: ambulated in room  Taken 6/1/2025 1200 by Christi Layton RN  Activity Management: ambulated in room  Medication Review/Management: medications reviewed  Taken 6/1/2025 0737 by Chritsi Layton RN  Activity Management:    ambulated in room   back to bed  Assistive Device Utilized: walker  Medication Review/Management: medications reviewed  Goal: Bariatric Home Regimen Maintained  6/1/2025 1703 by Christi Layton RN  Outcome: Progressing  6/1/2025 1444 by Christi Layton RN  Outcome: Progressing  Intervention: Maintain and Manage Postbariatric Surgery Care  Recent Flowsheet Documentation  Taken 6/1/2025 1600 by Christi Layton RN  Medication Review/Management: medications reviewed  Taken 6/1/2025 1200 by Christi Layton RN  Medication Review/Management: medications reviewed  Taken 6/1/2025 0737 by Christi Layton RN  Medication Review/Management: medications reviewed  Goal: Maintenance of Seizure Control  6/1/2025 1703 by Christi Layton RN  Outcome: Progressing  6/1/2025 1444 by Christi Layton RN  Outcome: Progressing  Intervention: Maintain Seizure Symptom Control  Recent Flowsheet Documentation  Taken 6/1/2025 1600 by Christi Layton RN  Medication Review/Management: medications reviewed  Taken 6/1/2025 1200 by Christi Layton RN  Medication Review/Management: medications reviewed  Taken 6/1/2025 0737 by Christi Layton RN  Medication Review/Management: medications reviewed     Problem: Violence Risk or Actual  Goal: Anger and Impulse Control  6/1/2025 1703 by Christi Layton RN  Outcome: Progressing  6/1/2025 1444 by Christi Layton RN  Outcome: Progressing  Intervention: Minimize Safety Risk  Recent Flowsheet Documentation  Taken 6/1/2025 1600 by Christi Layton RN  Enhanced Safety Measures: bed alarm refused  Taken 6/1/2025 1400 by Christi Layton RN  Enhanced Safety Measures:   bed alarm refused   family to remain at bedside  Taken 6/1/2025 1200 by Christi Layton RN  Enhanced Safety Measures:   bed alarm refused   family to remain at bedside  Taken 6/1/2025 1000 by Christi Layton RN  Enhanced Safety Measures:   bed alarm refused   family to remain at bedside  Taken 6/1/2025 0737 by Christi Layton RN  Enhanced Safety Measures: family to  remain at bedside     Problem: Fall Injury Risk  Goal: Absence of Fall and Fall-Related Injury  6/1/2025 1703 by Christi Layton RN  Outcome: Progressing  6/1/2025 1444 by Christi Layton RN  Outcome: Progressing  Intervention: Identify and Manage Contributors  Recent Flowsheet Documentation  Taken 6/1/2025 1600 by Christi Layton RN  Medication Review/Management: medications reviewed  Taken 6/1/2025 1200 by Christi Layton RN  Medication Review/Management: medications reviewed  Taken 6/1/2025 0737 by Christi Layton RN  Medication Review/Management: medications reviewed  Intervention: Promote Injury-Free Environment  Recent Flowsheet Documentation  Taken 6/1/2025 1600 by Christi Layton RN  Safety Promotion/Fall Prevention:   activity supervised   assistive device/personal items within reach   clutter free environment maintained   fall prevention program maintained   nonskid shoes/slippers when out of bed   room organization consistent   safety round/check completed  Taken 6/1/2025 1400 by Christi Layton RN  Safety Promotion/Fall Prevention:   activity supervised   assistive device/personal items within reach   clutter free environment maintained   fall prevention program maintained   nonskid shoes/slippers when out of bed   room organization consistent   safety round/check completed  Taken 6/1/2025 1200 by Christi Layton RN  Safety Promotion/Fall Prevention:   activity supervised   assistive device/personal items within reach   clutter free environment maintained   fall prevention program maintained   nonskid shoes/slippers when out of bed   room organization consistent   safety round/check completed  Taken 6/1/2025 1000 by Christi Layton RN  Safety Promotion/Fall Prevention:   activity supervised   assistive device/personal items within reach   clutter free environment maintained   fall prevention program maintained   nonskid shoes/slippers when out of bed   room organization consistent   safety round/check  completed  Taken 6/1/2025 0737 by Christi Layton RN  Safety Promotion/Fall Prevention:   activity supervised   assistive device/personal items within reach   clutter free environment maintained   fall prevention program maintained   nonskid shoes/slippers when out of bed   room organization consistent   safety round/check completed     Problem: Alcohol Withdrawal  Goal: Alcohol Withdrawal Symptom Control  6/1/2025 1703 by Christi Layton RN  Outcome: Progressing  6/1/2025 1444 by Christi Layton RN  Outcome: Progressing  Goal: Optimal Neurologic Function  6/1/2025 1703 by Christi Layton RN  Outcome: Progressing  6/1/2025 1444 by Christi Layton RN  Outcome: Progressing  Goal: Readiness for Change Identified  6/1/2025 1703 by Christi Layton RN  Outcome: Progressing  6/1/2025 1444 by Christi Layton RN  Outcome: Progressing   Goal Outcome Evaluation:

## 2025-06-01 NOTE — PLAN OF CARE
Problem: Adult Inpatient Plan of Care  Goal: Absence of Hospital-Acquired Illness or Injury  Intervention: Identify and Manage Fall Risk  Recent Flowsheet Documentation  Taken 6/1/2025 0557 by Jennifer Turner RN  Safety Promotion/Fall Prevention:   activity supervised   assistive device/personal items within reach   clutter free environment maintained   fall prevention program maintained   lighting adjusted   nonskid shoes/slippers when out of bed   safety round/check completed  Taken 6/1/2025 0340 by Jennifer Turner RN  Safety Promotion/Fall Prevention:   activity supervised   assistive device/personal items within reach   clutter free environment maintained   fall prevention program maintained   lighting adjusted   nonskid shoes/slippers when out of bed   safety round/check completed  Taken 6/1/2025 0200 by Jennifer Turner RN  Safety Promotion/Fall Prevention:   activity supervised   assistive device/personal items within reach   clutter free environment maintained   fall prevention program maintained   lighting adjusted   nonskid shoes/slippers when out of bed   safety round/check completed  Taken 6/1/2025 0118 by Jennifer Turner RN  Safety Promotion/Fall Prevention:   activity supervised   assistive device/personal items within reach   clutter free environment maintained   fall prevention program maintained   lighting adjusted   nonskid shoes/slippers when out of bed   safety round/check completed  Taken 5/31/2025 2330 by Jennifer Turner RN  Safety Promotion/Fall Prevention:   activity supervised   assistive device/personal items within reach   clutter free environment maintained   fall prevention program maintained   lighting adjusted   nonskid shoes/slippers when out of bed   safety round/check completed  Taken 5/31/2025 2200 by Jeninfer Turner RN  Safety Promotion/Fall Prevention:   activity supervised   assistive device/personal items within reach   clutter free environment maintained   fall  prevention program maintained   lighting adjusted   nonskid shoes/slippers when out of bed   safety round/check completed  Taken 5/31/2025 2016 by Jennifer Turner RN  Safety Promotion/Fall Prevention:   activity supervised   assistive device/personal items within reach   clutter free environment maintained   fall prevention program maintained   lighting adjusted   nonskid shoes/slippers when out of bed   safety round/check completed  Intervention: Prevent Skin Injury  Recent Flowsheet Documentation  Taken 5/31/2025 2330 by Jennifer Turner RN  Skin Protection: transparent dressing maintained  Taken 5/31/2025 2016 by Jennifer Turner RN  Body Position: position changed independently  Skin Protection: transparent dressing maintained  Intervention: Prevent and Manage VTE (Venous Thromboembolism) Risk  Recent Flowsheet Documentation  Taken 5/31/2025 2016 by Jennifer Turner RN  VTE Prevention/Management:   bilateral   SCDs (sequential compression devices) off   patient refused intervention  Intervention: Prevent Infection  Recent Flowsheet Documentation  Taken 6/1/2025 0557 by Jennifer Turner RN  Infection Prevention:   equipment surfaces disinfected   hand hygiene promoted   rest/sleep promoted   single patient room provided  Taken 6/1/2025 0340 by Jennifer Turner RN  Infection Prevention:   equipment surfaces disinfected   hand hygiene promoted   rest/sleep promoted   single patient room provided  Taken 6/1/2025 0200 by Jennifer Turner RN  Infection Prevention:   equipment surfaces disinfected   hand hygiene promoted   rest/sleep promoted   single patient room provided  Taken 6/1/2025 0118 by Jennifer Turner RN  Infection Prevention:   equipment surfaces disinfected   hand hygiene promoted   rest/sleep promoted   single patient room provided  Taken 5/31/2025 2330 by Jennifer Turner RN  Infection Prevention:   equipment surfaces disinfected   hand hygiene promoted   rest/sleep promoted   single patient room  provided  Taken 5/31/2025 2200 by Jennifer Turner, RN  Infection Prevention:   equipment surfaces disinfected   hand hygiene promoted   rest/sleep promoted   single patient room provided  Taken 5/31/2025 2016 by Jennifer Turner RN  Infection Prevention:   equipment surfaces disinfected   hand hygiene promoted   rest/sleep promoted   single patient room provided  Goal: Optimal Comfort and Wellbeing  Intervention: Provide Person-Centered Care  Recent Flowsheet Documentation  Taken 5/31/2025 2016 by Jennifer Turner RN  Trust Relationship/Rapport:   care explained   choices provided   thoughts/feelings acknowledged   Goal Outcome Evaluation:      Pt CIWA 20, 15, 10, 17. Treated as ordered per CIWA protocol, see MAR. Wife at the bedside entire shift. Refuses bed alarm and cardiac monitor d/t irritation/agitation. With medicating as ordered, he rested well and stated that symptoms are not as severe. All needs addressed and safety measures in place.

## 2025-06-01 NOTE — SIGNIFICANT NOTE
06/01/25 0919   PASRR   PASRR Status Approved/Complete     LOC approved for 90 days. No Level II on assessment pro noted. 6.1

## 2025-06-01 NOTE — PROGRESS NOTES
CARDIOLOGY PROGRESS NOTE:    Tyson Rosario  54 y.o.  male  1970  [id number]      Referring Provider: Hospitalist    Reason for follow-up: Stroke     Patient Care Team:  Lisa Vines MD as PCP - General (Family Medicine)    Subjective .  Patient does not have any symptoms today    Objective sitting in bed comfortably     Review of Systems   Constitutional: Negative for malaise/fatigue.   Cardiovascular:  Negative for chest pain, dyspnea on exertion, leg swelling and palpitations.   Respiratory:  Negative for cough and shortness of breath.    Gastrointestinal:  Negative for abdominal pain, nausea and vomiting.   Neurological:  Negative for dizziness, headaches, light-headedness, numbness and weakness.   All other systems reviewed and are negative.      Allergies: Patient has no known allergies.    Scheduled Meds:amLODIPine, 5 mg, Oral, Q24H  aspirin, 325 mg, Oral, Daily   Or  aspirin, 300 mg, Rectal, Daily  atorvastatin, 80 mg, Oral, Nightly  clopidogrel, 75 mg, Oral, Daily  cyanocobalamin, 1,000 mcg, Intramuscular, Q7 Days  folic acid 1 mg in sodium chloride 0.9 % 50 mL IVPB, 1 mg, Intravenous, Daily  furosemide, 20 mg, Oral, Daily  losartan, 12.5 mg, Oral, Q24H  naltrexone, 50 mg, Oral, Daily  nicotine, 1 patch, Transdermal, Q24H  thiamine (B-1) IV, 200 mg, Intravenous, Q8H   Followed by  [START ON 6/3/2025] thiamine, 100 mg, Oral, Daily      Continuous Infusions:niCARdipine, 5-15 mg/hr, Last Rate: Stopped (05/29/25 0349)      PRN Meds:.  acetaminophen **OR** acetaminophen **OR** acetaminophen    senna-docusate sodium **AND** polyethylene glycol **AND** bisacodyl **AND** bisacodyl    Calcium Replacement - Follow Nurse / BPA Driven Protocol    diazePAM **OR** diazePAM **OR** diazePAM **OR** diazePAM **OR** diazePAM **OR** diazePAM    hydrALAZINE    Magnesium Standard Dose Replacement - Follow Nurse / BPA Driven Protocol    melatonin    nitroglycerin    ondansetron    Phosphorus Replacement - Follow Nurse / BPA  "Driven Protocol    Potassium Replacement - Follow Nurse / BPA Driven Protocol        VITAL SIGNS  Vitals:    05/31/25 1100 05/31/25 1646 06/01/25 0733 06/01/25 1201   BP: 162/97 134/77 (!) 159/103 146/87   BP Location: Left arm Left arm Left arm Left arm   Patient Position: Lying Lying Lying Lying   Pulse: 94 86 90 92   Resp: 18 18 16 16   Temp:  97.9 °F (36.6 °C) 97.8 °F (36.6 °C) 97.8 °F (36.6 °C)   TempSrc:  Oral Oral Oral   SpO2:  90% 92% 97%   Weight:       Height:           Flowsheet Rows      Flowsheet Row First Filed Value   Admission Height 182.9 cm (72\") Documented at 05/29/2025 0100   Admission Weight 97 kg (213 lb 13.5 oz) Documented at 05/29/2025 0100             TELEMETRY: Sinus rhythm    Physical Exam:  Constitutional:       Appearance: Well-developed.   Eyes:      General: No scleral icterus.     Conjunctiva/sclera: Conjunctivae normal.   HENT:      Head: Normocephalic and atraumatic.   Neck:      Vascular: No carotid bruit or JVD.   Pulmonary:      Effort: Pulmonary effort is normal.      Breath sounds: Normal breath sounds. No wheezing. No rales.   Cardiovascular:      Normal rate. Regular rhythm.   Pulses:     Intact distal pulses.   Abdominal:      General: Bowel sounds are normal.      Palpations: Abdomen is soft.   Musculoskeletal:      Cervical back: Normal range of motion and neck supple. Skin:     General: Skin is warm and dry.      Findings: No rash.   Neurological:      Mental Status: Alert.          Results Review:   I reviewed the patient's new clinical results.  Lab Results (last 24 hours)       Procedure Component Value Units Date/Time    CBC (No Diff) [[id number]]  (Abnormal) Collected: 06/01/25 0518    Specimen: Blood Updated: 06/01/25 0611     WBC 7.00 10*3/mm3      RBC 4.14 10*6/mm3      Hemoglobin 16.4 g/dL      Hematocrit 47.9 %      .7 fL      MCH 39.6 pg      MCHC 34.2 g/dL      RDW 14.2 %      RDW-SD 61.2 fl      MPV 11.5 fL      Platelets 119 10*3/mm3       "       Imaging Results (Last 24 Hours)       ** No results found for the last 24 hours. **            EKG      I personally viewed and interpreted the patient's EKG/Telemetry data:    ECHOCARDIOGRAM:  Results for orders placed during the hospital encounter of 05/29/25    Adult Transthoracic Echo Complete W/ Cont if Necessary Per Protocol (With Agitated Saline)    Interpretation Summary    Left ventricular systolic function is normal. Left ventricular ejection fraction appears to be 56 - 60%.    Left ventricular diastolic function was normal.    Saline test results are negative.       STRESS MYOVIEW:       CARDIAC CATHETERIZATION:  Results for orders placed during the hospital encounter of 03/17/20    Cardiac Catheterization/Vascular Study    Narrative  CARDIAC CATHETERIZATION REPORT      DATE OF PROCEDURE:  3/17/2020    INDICATION FOR PROCEDURE:    Abnormal stress test  Chest pain  Shortness of breath    PROCEDURE PERFORMED:    Left heart catheterization  coronary angiography  left ventriculography    PROCEDURE COMMENTS:    After informed consent was obtained, the patient was prepped and draped in the usual sterile manner.  Mild to moderate sedation was administered.  Right femoral artery was accessed without difficulty and 6 Vincentian arterial sheath was inserted.  Sheath was flushed with heparinized saline.    Using 6 Vincentian Kylah catheters, first left coronary artery and the right coronary was electively engaged and appropriate views were taken.  A 6 Vincentian JR4 catheter was used to cross aortic valve and left heart catheterization was performed.  Left ventriculography was done in a review.    Patient tolerated the procedure well.    FINDINGS:    1. HEMODYNAMICS:    Aortic pressure: 122/65 mmHg    LVEDP: 5 mmHg    Gradient across aortic valve on pullback: No gradient across aortic valve      2. LEFT VENTRICULOGRAPHY: 55%      3. CORONARY ANGIOGRAPHY:    A: Left main coronary artery: Normal    B: Left anterior  descending artery: Minimal plaquing noted in proximal and mid LAD.  No high-grade stenosis    C: Left circumflex coronary artery: 10% plaquing in the mid LCx    D: Right coronary artery: 20 to 25% plaque in the proximal segment of RCA.  RCA is dominant vessel    SUMMARY:    Minimal coronary artery disease  Preserved left ventricular function    RECOMMENDATIONS:    Medical treatment       OTHER:         Assessment & Plan     Acute stroke  Patient presented with acute stroke in the right MCA territory and is seen by neurologist and had an echocardiogram which showed normal LV systolic function  Patient also had an MRI of the brain and CT of the head which showed moderate stenosis of the right internal carotid artery and is on medical therapy  Patient will stay here and have a [facility] as an inpatient    Coronary artery disease  Patient had border elevated troponin which could be secondary to hypertensive emergency but only has nonapproved disease in the past and will have an outpatient workup also    Hypertension  Patient blood pressure is very high initially and he is currently on amlodipine losartan.    Hyperlipidemia  Patient is on high-dose statins now.        Ezequiel Kauffman MD  06/01/25  14:05 EDT

## 2025-06-01 NOTE — PLAN OF CARE
Problem: Comorbidity Management  Goal: Maintenance of Asthma Control  Outcome: Progressing  Intervention: Maintain Asthma Symptom Control  Recent Flowsheet Documentation  Taken 6/1/2025 1200 by Christi Layton RN  Medication Review/Management: medications reviewed  Taken 6/1/2025 0737 by Christi Layton RN  Medication Review/Management: medications reviewed  Goal: Maintenance of Behavioral Health Symptom Control  Outcome: Progressing  Intervention: Maintain Behavioral Health Symptom Control  Recent Flowsheet Documentation  Taken 6/1/2025 1200 by Christi Layton RN  Medication Review/Management: medications reviewed  Taken 6/1/2025 0737 by Christi Layton RN  Medication Review/Management: medications reviewed  Goal: Maintenance of COPD Symptom Control  Outcome: Progressing  Intervention: Maintain COPD (Chronic Obstructive Pulmonary Disease) Symptom Control  Recent Flowsheet Documentation  Taken 6/1/2025 1200 by Christi Layton RN  Medication Review/Management: medications reviewed  Taken 6/1/2025 0737 by Christi Layton RN  Medication Review/Management: medications reviewed  Goal: Blood Glucose Level Within Target Range  Outcome: Progressing  Intervention: Monitor and Manage Glycemia  Recent Flowsheet Documentation  Taken 6/1/2025 1200 by Christi Layton RN  Medication Review/Management: medications reviewed  Taken 6/1/2025 0737 by Christi Layton RN  Medication Review/Management: medications reviewed  Goal: Maintenance of Heart Failure Symptom Control  Outcome: Progressing  Intervention: Maintain Heart Failure Management  Recent Flowsheet Documentation  Taken 6/1/2025 1200 by Christi Layton RN  Medication Review/Management: medications reviewed  Taken 6/1/2025 0737 by Christi Layton RN  Medication Review/Management: medications reviewed  Goal: Blood Pressure in Desired Range  Outcome: Progressing  Intervention: Maintain Blood Pressure Management  Recent Flowsheet Documentation  Taken 6/1/2025 1200 by Christi Layton  RN  Medication Review/Management: medications reviewed  Taken 6/1/2025 0737 by Christi Layton RN  Medication Review/Management: medications reviewed  Goal: Maintenance of Osteoarthritis Symptom Control  Outcome: Progressing  Intervention: Maintain Osteoarthritis Symptom Control  Recent Flowsheet Documentation  Taken 6/1/2025 1400 by Christi Layton RN  Activity Management: ambulated in room  Taken 6/1/2025 1200 by Christi Layton RN  Activity Management: ambulated in room  Medication Review/Management: medications reviewed  Taken 6/1/2025 0737 by Christi Layton RN  Activity Management:   ambulated in room   back to bed  Assistive Device Utilized: walker  Medication Review/Management: medications reviewed  Goal: Bariatric Home Regimen Maintained  Outcome: Progressing  Intervention: Maintain and Manage Postbariatric Surgery Care  Recent Flowsheet Documentation  Taken 6/1/2025 1200 by Christi Layton RN  Medication Review/Management: medications reviewed  Taken 6/1/2025 0737 by Christi Layton RN  Medication Review/Management: medications reviewed  Goal: Maintenance of Seizure Control  Outcome: Progressing  Intervention: Maintain Seizure Symptom Control  Recent Flowsheet Documentation  Taken 6/1/2025 1200 by Christi Layton RN  Medication Review/Management: medications reviewed  Taken 6/1/2025 0737 by Christi Layton RN  Medication Review/Management: medications reviewed     Problem: Violence Risk or Actual  Goal: Anger and Impulse Control  Outcome: Progressing  Intervention: Minimize Safety Risk  Recent Flowsheet Documentation  Taken 6/1/2025 1400 by Christi Layton RN  Enhanced Safety Measures:   bed alarm refused   family to remain at bedside  Taken 6/1/2025 1200 by Christi Layton RN  Enhanced Safety Measures:   bed alarm refused   family to remain at bedside  Taken 6/1/2025 1000 by Christi Layton RN  Enhanced Safety Measures:   bed alarm refused   family to remain at bedside  Taken 6/1/2025 0737 by Christi Layton  RN  Enhanced Safety Measures: family to remain at bedside     Problem: Fall Injury Risk  Goal: Absence of Fall and Fall-Related Injury  Outcome: Progressing  Intervention: Identify and Manage Contributors  Recent Flowsheet Documentation  Taken 6/1/2025 1200 by Christi Layton RN  Medication Review/Management: medications reviewed  Taken 6/1/2025 0737 by Christi Layton RN  Medication Review/Management: medications reviewed  Intervention: Promote Injury-Free Environment  Recent Flowsheet Documentation  Taken 6/1/2025 1400 by Christi Layton RN  Safety Promotion/Fall Prevention:   activity supervised   assistive device/personal items within reach   clutter free environment maintained   fall prevention program maintained   nonskid shoes/slippers when out of bed   room organization consistent   safety round/check completed  Taken 6/1/2025 1200 by Christi Layton RN  Safety Promotion/Fall Prevention:   activity supervised   assistive device/personal items within reach   clutter free environment maintained   fall prevention program maintained   nonskid shoes/slippers when out of bed   room organization consistent   safety round/check completed  Taken 6/1/2025 1000 by Christi Layton RN  Safety Promotion/Fall Prevention:   activity supervised   assistive device/personal items within reach   clutter free environment maintained   fall prevention program maintained   nonskid shoes/slippers when out of bed   room organization consistent   safety round/check completed  Taken 6/1/2025 0737 by Christi Layton RN  Safety Promotion/Fall Prevention:   activity supervised   assistive device/personal items within reach   clutter free environment maintained   fall prevention program maintained   nonskid shoes/slippers when out of bed   room organization consistent   safety round/check completed     Problem: Alcohol Withdrawal  Goal: Alcohol Withdrawal Symptom Control  Outcome: Progressing  Goal: Optimal Neurologic Function  Outcome:  Progressing  Goal: Readiness for Change Identified  Outcome: Progressing   Goal Outcome Evaluation:
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
## 2025-06-01 NOTE — SIGNIFICANT NOTE
06/01/25 1601   OTHER   Discipline physical therapy assistant   Rehab Time/Intention   Session Not Performed other (see comments)  (checked on patient in am and pm and patient asleep, RN asked to not wake him up)   Recommendation   PT - Next Appointment 06/02/25

## 2025-06-01 NOTE — PROGRESS NOTES
[facility] MEDICINE SERVICE  DAILY PROGRESS NOTE    NAME: Tyson Rosario  : 1970  MRN: [id number]      LOS: 3 days     PROVIDER OF SERVICE: Kevin Thorne MD    Chief Complaint: Stroke    Subjective:     Interval History:  History taken from: patient    Seen and examined at bedside, wife also present.  Discussed possible discharge tomorrow morning.        Review of Systems:   Review of Systems negative except as mentioned above    Objective:     Vital Signs  Temp:  [97.8 °F (36.6 °C)-97.9 °F (36.6 °C)] 97.8 °F (36.6 °C)  Heart Rate:  [86-90] 90  Resp:  [16-18] 16  BP: (134-159)/() 159/103   Body mass index is 28.89 kg/m².    Physical Exam  Physical Exam  Constitutional:       Appearance: Normal appearance.   Cardiovascular:      Rate and Rhythm: Normal rate and regular rhythm.   Pulmonary:      Effort: Pulmonary effort is normal.   Musculoskeletal:      Left hand: Edema present.   Skin:     General: Skin is warm and dry.   Neurological:      General: No focal deficit present.      Mental Status: He is alert and oriented to person, place, and time. Mental status is at baseline.      Motor: Weakness (Left ) present.   Psychiatric:         Mood and Affect: Mood normal.         Behavior: Behavior normal.      Diagnostic Data    Results from last 7 days   Lab Units 25  0518 25  0147 25  1406 25  0419   WBC 10*3/mm3 7.00 5.75   < >  --    HEMOGLOBIN g/dL 16.4 14.9   < >  --    HEMATOCRIT % 47.9 43.8   < >  --    PLATELETS 10*3/mm3 119* 115*   < >  --    GLUCOSE mg/dL  --  117*   < >  --    CREATININE mg/dL  --  0.71*   < >  --    BUN mg/dL  --  8.9   < >  --    SODIUM mmol/L  --  137   < >  --    POTASSIUM mmol/L  --  3.8   < >  --    AST (SGOT) U/L  --   --   --  39   ALT (SGPT) U/L  --   --   --  25   ALK PHOS U/L  --   --   --  108   BILIRUBIN mg/dL  --   --   --  0.9   ANION GAP mmol/L  --  9.3   < >  --     < > = values in this interval not displayed.       No  radiology results for the last day        I reviewed the patient's new clinical results.    Assessment/Plan:     Active and Resolved Problems  Active Hospital Problems    Diagnosis  POA    **Stroke [I63.9]  Yes      Resolved Hospital Problems   No resolved problems to display.       Acute to subacute strokes within the right MCA territory incoling right temporal lobe and right pre and postcentral cyri. Strokes appear embolic.   -Last known normal 5/25/25  -CT of the head showed no acute process  -CTA of the head and neck showed an age indeterminate infarct in the right parietal and temporal lobes and moderate plaque in the right ICA  -Aspirin ordered  -Lipitor ordered  -Neurochecks ordered  -Lipid panel, A1C, TSH, ESR, Folate and B12 labs ordered  -PT/OT consults  -MRI of the brain reviewed  -2D echo reviewed  -Cardiology consulted, patient will have BAHMAN in outpatient.  -Neurology following, recommend DAPT for now.     Essential Hypertension  -Noncompliant with medications  -Uncontrolled, initial BP was 200/130  -Improved with IV Hydralazine  -Restart home medications     Alcohol Abuse  Tobacco Abuse  -Reports drinking 1/5 of [place] daily  -Last drink on Tuesday, 5/27/25  -Alcohol withdrawal protocol ordered  -Thiamine and Folic Acid replacement  -Electrolyte replacement protocol ordered  -Seizure, Safety precautions  -Nicotine patch ordered     Elevated Troponin  -Possibly due to chronically uncontrolled HTN  -Troponin at outside facility 288, 290  -Trend Troponin here  -Denies Chest pain  -EKG showed SR  -Continuous cardiac monitoring  - Cardiology following, planning for BAHMAN and possible ischemic evaluation    VTE Prophylaxis:  Mechanical VTE prophylaxis orders are present.             Disposition Planning:     Barriers to Discharge: BAHMAN, alcohol withdrawal  Anticipated Date of Discharge: 6/2  Place of Discharge: Rehab      Time: 35 minutes     Code Status and Medical Interventions: CPR (Attempt to Resuscitate);  Full Support   Ordered at: 05/29/25 0119     Code Status (Patient has no pulse and is not breathing):    CPR (Attempt to Resuscitate)     Medical Interventions (Patient has pulse or is breathing):    Full Support       Signature: Electronically signed by Kevin Thorne MD, 06/01/25, 11:34 EDT.  [facility]

## 2025-06-02 VITALS
HEIGHT: 72 IN | TEMPERATURE: 98.1 F | BODY MASS INDEX: 28.85 KG/M2 | HEART RATE: 85 BPM | RESPIRATION RATE: 21 BRPM | WEIGHT: 213 LBS | DIASTOLIC BLOOD PRESSURE: 96 MMHG | SYSTOLIC BLOOD PRESSURE: 173 MMHG | OXYGEN SATURATION: 92 %

## 2025-06-02 DIAGNOSIS — I63.9 CEREBROVASCULAR ACCIDENT (CVA), UNSPECIFIED MECHANISM: Primary | ICD-10-CM

## 2025-06-02 LAB
QT INTERVAL: 402 MS
QTC INTERVAL: 475 MS

## 2025-06-02 PROCEDURE — 97116 GAIT TRAINING THERAPY: CPT

## 2025-06-02 PROCEDURE — 25010000002 HYDRALAZINE PER 20 MG

## 2025-06-02 PROCEDURE — 97110 THERAPEUTIC EXERCISES: CPT

## 2025-06-02 PROCEDURE — 99232 SBSQ HOSP IP/OBS MODERATE 35: CPT | Performed by: INTERNAL MEDICINE

## 2025-06-02 PROCEDURE — 93010 ELECTROCARDIOGRAM REPORT: CPT | Performed by: INTERNAL MEDICINE

## 2025-06-02 PROCEDURE — 25010000002 DIAZEPAM PER 5 MG

## 2025-06-02 PROCEDURE — 93005 ELECTROCARDIOGRAM TRACING: CPT

## 2025-06-02 PROCEDURE — 25010000002 THIAMINE PER 100 MG

## 2025-06-02 RX ORDER — AMLODIPINE BESYLATE 5 MG/1
5 TABLET ORAL
Qty: 30 TABLET | Refills: 0 | Status: SHIPPED | OUTPATIENT
Start: 2025-06-02 | End: 2025-07-02

## 2025-06-02 RX ORDER — ARIPIPRAZOLE 2 MG/1
2 TABLET ORAL DAILY
Qty: 30 TABLET | Refills: 0 | Status: SHIPPED | OUTPATIENT
Start: 2025-06-03 | End: 2025-07-03

## 2025-06-02 RX ORDER — ARIPIPRAZOLE 2 MG/1
2 TABLET ORAL DAILY
Status: DISCONTINUED | OUTPATIENT
Start: 2025-06-02 | End: 2025-06-02 | Stop reason: HOSPADM

## 2025-06-02 RX ORDER — ATORVASTATIN CALCIUM 80 MG/1
80 TABLET, FILM COATED ORAL NIGHTLY
Qty: 30 TABLET | Refills: 0 | Status: SHIPPED | OUTPATIENT
Start: 2025-06-02 | End: 2025-07-02

## 2025-06-02 RX ORDER — CLOPIDOGREL BISULFATE 75 MG/1
75 TABLET ORAL DAILY
Qty: 30 TABLET | Refills: 0 | Status: SHIPPED | OUTPATIENT
Start: 2025-06-02 | End: 2025-07-02

## 2025-06-02 RX ADMIN — CLOPIDOGREL BISULFATE 75 MG: 75 TABLET, FILM COATED ORAL at 10:53

## 2025-06-02 RX ADMIN — FUROSEMIDE 20 MG: 20 TABLET ORAL at 10:54

## 2025-06-02 RX ADMIN — LOSARTAN POTASSIUM 12.5 MG: 25 TABLET, FILM COATED ORAL at 10:53

## 2025-06-02 RX ADMIN — DIAZEPAM 15 MG: 10 INJECTION, SOLUTION INTRAMUSCULAR; INTRAVENOUS at 01:40

## 2025-06-02 RX ADMIN — ASPIRIN 325 MG ORAL TABLET 325 MG: 325 PILL ORAL at 10:53

## 2025-06-02 RX ADMIN — ARIPIPRAZOLE 2 MG: 2 TABLET ORAL at 09:14

## 2025-06-02 RX ADMIN — DIAZEPAM 20 MG: 10 INJECTION, SOLUTION INTRAMUSCULAR; INTRAVENOUS at 09:14

## 2025-06-02 RX ADMIN — NICOTINE 1 PATCH: 21 PATCH TRANSDERMAL at 08:11

## 2025-06-02 RX ADMIN — HYDRALAZINE HYDROCHLORIDE 10 MG: 20 INJECTION INTRAMUSCULAR; INTRAVENOUS at 03:21

## 2025-06-02 RX ADMIN — THIAMINE HYDROCHLORIDE 200 MG: 100 INJECTION, SOLUTION INTRAMUSCULAR; INTRAVENOUS at 08:05

## 2025-06-02 RX ADMIN — AMLODIPINE BESYLATE 5 MG: 5 TABLET ORAL at 10:53

## 2025-06-02 RX ADMIN — DIAZEPAM 15 MG: 10 INJECTION, SOLUTION INTRAMUSCULAR; INTRAVENOUS at 03:42

## 2025-06-02 RX ADMIN — DIAZEPAM 20 MG: 10 INJECTION, SOLUTION INTRAMUSCULAR; INTRAVENOUS at 08:05

## 2025-06-02 RX ADMIN — NALTREXONE HYDROCHLORIDE 50 MG: 50 TABLET, FILM COATED ORAL at 10:54

## 2025-06-02 NOTE — PLAN OF CARE
Problem: Comorbidity Management  Goal: Maintenance of Asthma Control  Outcome: Progressing  Intervention: Maintain Asthma Symptom Control  Recent Flowsheet Documentation  Taken 6/2/2025 0400 by Cristian Dean RN  Medication Review/Management: medications reviewed  Taken 6/2/2025 0156 by Cristian Dean RN  Medication Review/Management: medications reviewed  Taken 6/2/2025 0007 by Cristian Dena RN  Medication Review/Management: medications reviewed  Taken 6/1/2025 2200 by Cristian Dean RN  Medication Review/Management: medications reviewed  Taken 6/1/2025 1957 by Cristian Dean RN  Medication Review/Management: medications reviewed  Goal: Maintenance of Behavioral Health Symptom Control  Outcome: Progressing  Intervention: Maintain Behavioral Health Symptom Control  Recent Flowsheet Documentation  Taken 6/2/2025 0400 by Cristian Dean RN  Medication Review/Management: medications reviewed  Taken 6/2/2025 0156 by Cristian Dean RN  Medication Review/Management: medications reviewed  Taken 6/2/2025 0007 by Cristian Dean RN  Medication Review/Management: medications reviewed  Taken 6/1/2025 2200 by Cristian Dean RN  Medication Review/Management: medications reviewed  Taken 6/1/2025 1957 by Cristian Dean RN  Medication Review/Management: medications reviewed  Goal: Maintenance of COPD Symptom Control  Outcome: Progressing  Intervention: Maintain COPD (Chronic Obstructive Pulmonary Disease) Symptom Control  Recent Flowsheet Documentation  Taken 6/2/2025 0400 by Cristian Dean RN  Medication Review/Management: medications reviewed  Taken 6/2/2025 0156 by Cristian Dean RN  Medication Review/Management: medications reviewed  Taken 6/2/2025 0007 by Cristian Dean RN  Medication Review/Management: medications reviewed  Taken 6/1/2025 2200 by Cristian Dean RN  Medication Review/Management: medications reviewed  Taken 6/1/2025 1957 by  Cristian Dean RN  Medication Review/Management: medications reviewed  Goal: Blood Glucose Level Within Target Range  Outcome: Progressing  Intervention: Monitor and Manage Glycemia  Recent Flowsheet Documentation  Taken 6/2/2025 0400 by Cristian Dean RN  Medication Review/Management: medications reviewed  Taken 6/2/2025 0156 by Cristian Dean RN  Medication Review/Management: medications reviewed  Taken 6/2/2025 0007 by Cristian Dean RN  Medication Review/Management: medications reviewed  Taken 6/1/2025 2200 by Cristian Dean RN  Medication Review/Management: medications reviewed  Taken 6/1/2025 1957 by Cristian Dean RN  Medication Review/Management: medications reviewed  Goal: Maintenance of Heart Failure Symptom Control  Outcome: Progressing  Intervention: Maintain Heart Failure Management  Recent Flowsheet Documentation  Taken 6/2/2025 0400 by Cristian Dean RN  Medication Review/Management: medications reviewed  Taken 6/2/2025 0156 by Cristian Dean RN  Medication Review/Management: medications reviewed  Taken 6/2/2025 0007 by Cristian Dean RN  Medication Review/Management: medications reviewed  Taken 6/1/2025 2200 by Cristian Dean RN  Medication Review/Management: medications reviewed  Taken 6/1/2025 1957 by Cristian Dean RN  Medication Review/Management: medications reviewed  Goal: Blood Pressure in Desired Range  Outcome: Progressing  Intervention: Maintain Blood Pressure Management  Recent Flowsheet Documentation  Taken 6/2/2025 0400 by Cristian Dean RN  Medication Review/Management: medications reviewed  Taken 6/2/2025 0156 by Cristian Dean RN  Medication Review/Management: medications reviewed  Taken 6/2/2025 0007 by Cristian Dean RN  Medication Review/Management: medications reviewed  Taken 6/1/2025 2200 by Cristian Dean RN  Medication Review/Management: medications reviewed  Taken 6/1/2025 1957 by  Cristian Dean RN  Medication Review/Management: medications reviewed  Goal: Maintenance of Osteoarthritis Symptom Control  Outcome: Progressing  Intervention: Maintain Osteoarthritis Symptom Control  Recent Flowsheet Documentation  Taken 6/2/2025 0400 by Cristian Dean RN  Medication Review/Management: medications reviewed  Taken 6/2/2025 0156 by Cristian Dean RN  Medication Review/Management: medications reviewed  Taken 6/2/2025 0007 by Cristian Dean RN  Medication Review/Management: medications reviewed  Taken 6/1/2025 2200 by Cristian Dean RN  Medication Review/Management: medications reviewed  Taken 6/1/2025 1957 by Cristian Dean RN  Medication Review/Management: medications reviewed  Goal: Bariatric Home Regimen Maintained  Outcome: Progressing  Intervention: Maintain and Manage Postbariatric Surgery Care  Recent Flowsheet Documentation  Taken 6/2/2025 0400 by Cristian Dean RN  Medication Review/Management: medications reviewed  Taken 6/2/2025 0156 by Cristian Dean RN  Medication Review/Management: medications reviewed  Taken 6/2/2025 0007 by Cristian Dean RN  Medication Review/Management: medications reviewed  Taken 6/1/2025 2200 by Cristian Dean RN  Medication Review/Management: medications reviewed  Taken 6/1/2025 1957 by Cristian Dean RN  Medication Review/Management: medications reviewed  Goal: Maintenance of Seizure Control  Outcome: Progressing  Intervention: Maintain Seizure Symptom Control  Recent Flowsheet Documentation  Taken 6/2/2025 0400 by Cristian Dean RN  Sensory Stimulation Regulation: care clustered  Medication Review/Management: medications reviewed  Taken 6/2/2025 0156 by Cristian Dean RN  Medication Review/Management: medications reviewed  Taken 6/2/2025 0007 by Cristian Dean RN  Medication Review/Management: medications reviewed  Taken 6/1/2025 2200 by Cristian Dean RN  Medication  Review/Management: medications reviewed  Taken 6/1/2025 1957 by Cristian Dean RN  Medication Review/Management: medications reviewed     Problem: Violence Risk or Actual  Goal: Anger and Impulse Control  Outcome: Progressing  Intervention: Minimize Safety Risk  Recent Flowsheet Documentation  Taken 6/2/2025 0400 by Cristian Dean RN  Sensory Stimulation Regulation: care clustered  De-Escalation Techniques: increased round frequency  Enhanced Safety Measures: bed alarm set  Taken 6/2/2025 0156 by Cristian Dean RN  De-Escalation Techniques: increased round frequency  Enhanced Safety Measures: bed alarm set  Taken 6/2/2025 0007 by Cristian Dean RN  De-Escalation Techniques: increased round frequency  Enhanced Safety Measures: bed alarm set  Taken 6/1/2025 2200 by Cristian Dean RN  De-Escalation Techniques: increased round frequency  Enhanced Safety Measures: bed alarm set  Taken 6/1/2025 1957 by Cristian Dean RN  De-Escalation Techniques: increased round frequency  Enhanced Safety Measures: bed alarm set  Intervention: Promote Self-Control  Recent Flowsheet Documentation  Taken 6/2/2025 0400 by Cristian Dean RN  Supportive Measures: active listening utilized  Environmental Support: calm environment promoted  Taken 6/2/2025 0007 by Cristian Dean RN  Supportive Measures: active listening utilized  Environmental Support: calm environment promoted  Taken 6/1/2025 1957 by Cristian Dean RN  Supportive Measures: active listening utilized  Environmental Support: calm environment promoted     Problem: Fall Injury Risk  Goal: Absence of Fall and Fall-Related Injury  Outcome: Progressing  Intervention: Identify and Manage Contributors  Recent Flowsheet Documentation  Taken 6/2/2025 0400 by Cristian Dean RN  Medication Review/Management: medications reviewed  Taken 6/2/2025 0156 by Cristian Dean RN  Medication Review/Management: medications reviewed  Taken  6/2/2025 0007 by Cristian Dean, RN  Medication Review/Management: medications reviewed  Taken 6/1/2025 2200 by Cristian Dean, RN  Medication Review/Management: medications reviewed  Taken 6/1/2025 1957 by Cristian Dean, RN  Medication Review/Management: medications reviewed  Intervention: Promote Injury-Free Environment  Recent Flowsheet Documentation  Taken 6/2/2025 0400 by Cristian Dean, RN  Safety Promotion/Fall Prevention:   safety round/check completed   room organization consistent   lighting adjusted   fall prevention program maintained   clutter free environment maintained   assistive device/personal items within reach   activity supervised  Taken 6/2/2025 0156 by Cristian Dean, RN  Safety Promotion/Fall Prevention:   safety round/check completed   room organization consistent   lighting adjusted   fall prevention program maintained   clutter free environment maintained   assistive device/personal items within reach   activity supervised  Taken 6/2/2025 0007 by Cristian Dean, RN  Safety Promotion/Fall Prevention:   safety round/check completed   room organization consistent   lighting adjusted   fall prevention program maintained   clutter free environment maintained   assistive device/personal items within reach   activity supervised  Taken 6/1/2025 2200 by Cristian Dean, RN  Safety Promotion/Fall Prevention:   safety round/check completed   room organization consistent   lighting adjusted   fall prevention program maintained   clutter free environment maintained   assistive device/personal items within reach   activity supervised  Taken 6/1/2025 1957 by Cristian Dean, RN  Safety Promotion/Fall Prevention:   safety round/check completed   room organization consistent   lighting adjusted   fall prevention program maintained   clutter free environment maintained   assistive device/personal items within reach   activity supervised     Problem: Alcohol  Withdrawal  Goal: Alcohol Withdrawal Symptom Control  Outcome: Progressing  Intervention: Minimize or Manage Alcohol Withdrawal Symptoms  Recent Flowsheet Documentation  Taken 6/2/2025 0400 by Cristian Dean RN  Sensory Stimulation Regulation: care clustered  Goal: Optimal Neurologic Function  Outcome: Progressing  Intervention: Minimize or Manage Acute Neurologic Symptoms  Recent Flowsheet Documentation  Taken 6/2/2025 0400 by Cristian Dean RN  Sensory Stimulation Regulation: care clustered  Cerebral Perfusion Promotion: blood pressure monitored  Taken 6/2/2025 0007 by Cristian Dean RN  Airway/Ventilation Management: airway patency maintained  Cerebral Perfusion Promotion: blood pressure monitored  Taken 6/1/2025 1957 by Cristian Dean RN  Cerebral Perfusion Promotion: blood pressure monitored  Goal: Readiness for Change Identified  Outcome: Progressing  Intervention: Promote Psychosocial Wellbeing  Recent Flowsheet Documentation  Taken 6/1/2025 1957 by Cristian Dean RN  Family/Support System Care: self-care encouraged  Intervention: Partner to Facilitate Behavior Change  Recent Flowsheet Documentation  Taken 6/2/2025 0400 by Cristian Dean RN  Supportive Measures: active listening utilized  Taken 6/2/2025 0007 by Cristian Dean RN  Supportive Measures: active listening utilized  Taken 6/1/2025 1957 by Cristian Dean RN  Supportive Measures: active listening utilized   Goal Outcome Evaluation:

## 2025-06-02 NOTE — PROGRESS NOTES
CARDIOLOGY PROGRESS NOTE:    Tyson Rosario  54 y.o.  male  1970  [id number]      Referring Provider: Hospitalist    Reason for follow-up: Stroke     Patient Care Team:  Lisa Vines MD as PCP - General (Family Medicine)    Subjective .  Patient does not have any symptoms today  No chest pain shortness of breath  Out of bed    Objective sitting in bed comfortably     Review of Systems   Constitutional: Negative for malaise/fatigue.   Cardiovascular:  Negative for chest pain, dyspnea on exertion, leg swelling and palpitations.   Respiratory:  Negative for cough and shortness of breath.    Gastrointestinal:  Negative for abdominal pain, nausea and vomiting.   Neurological:  Negative for dizziness, headaches, light-headedness, numbness and weakness.   All other systems reviewed and are negative.      Allergies: Patient has no known allergies.    Scheduled Meds:amLODIPine, 5 mg, Oral, Q24H  ARIPiprazole, 2 mg, Oral, Daily  aspirin, 325 mg, Oral, Daily   Or  aspirin, 300 mg, Rectal, Daily  atorvastatin, 80 mg, Oral, Nightly  clopidogrel, 75 mg, Oral, Daily  cyanocobalamin, 1,000 mcg, Intramuscular, Q7 Days  folic acid 1 mg in sodium chloride 0.9 % 50 mL IVPB, 1 mg, Intravenous, Daily  furosemide, 20 mg, Oral, Daily  losartan, 12.5 mg, Oral, Q24H  naltrexone, 50 mg, Oral, Daily  nicotine, 1 patch, Transdermal, Q24H  thiamine (B-1) IV, 200 mg, Intravenous, Q8H   Followed by  [START ON 6/3/2025] thiamine, 100 mg, Oral, Daily      Continuous Infusions:niCARdipine, 5-15 mg/hr, Last Rate: Stopped (05/29/25 0349)      PRN Meds:.  acetaminophen **OR** acetaminophen **OR** acetaminophen    senna-docusate sodium **AND** polyethylene glycol **AND** bisacodyl **AND** bisacodyl    Calcium Replacement - Follow Nurse / BPA Driven Protocol    diazePAM **OR** diazePAM **OR** diazePAM **OR** diazePAM **OR** diazePAM **OR** diazePAM    hydrALAZINE    Magnesium Standard Dose Replacement - Follow Nurse / BPA Driven Protocol     "melatonin    nitroglycerin    ondansetron    Phosphorus Replacement - Follow Nurse / BPA Driven Protocol    Potassium Replacement - Follow Nurse / BPA Driven Protocol        VITAL SIGNS  Vitals:    06/01/25 2108 06/01/25 2320 06/02/25 0300 06/02/25 0818   BP: (!) 173/107 (!) 161/108 173/96    BP Location: Left arm Left arm Left arm    Patient Position: Sitting Lying Lying    Pulse: 92  85    Resp: 18   21   Temp: 98.1 °F (36.7 °C) 97.2 °F (36.2 °C) 97.4 °F (36.3 °C) 98.1 °F (36.7 °C)   TempSrc: Oral Oral Oral Oral   SpO2: 92%  92%    Weight:       Height:           Flowsheet Rows      Flowsheet Row First Filed Value   Admission Height 182.9 cm (72\") Documented at 05/29/2025 0100   Admission Weight 97 kg (213 lb 13.5 oz) Documented at 05/29/2025 0100             TELEMETRY: Sinus rhythm    Physical Exam:  Constitutional:       Appearance: Well-developed.   Eyes:      General: No scleral icterus.     Conjunctiva/sclera: Conjunctivae normal.   HENT:      Head: Normocephalic and atraumatic.   Neck:      Vascular: No carotid bruit or JVD.   Pulmonary:      Effort: Pulmonary effort is normal.      Breath sounds: Normal breath sounds. No wheezing. No rales.   Cardiovascular:      Normal rate. Regular rhythm.   Pulses:     Intact distal pulses.   Abdominal:      General: Bowel sounds are normal.      Palpations: Abdomen is soft.   Musculoskeletal:      Cervical back: Normal range of motion and neck supple. Skin:     General: Skin is warm and dry.      Findings: No rash.   Neurological:      Mental Status: Alert.     Unchanged from prior    Results Review:   I reviewed the patient's new clinical results.  Lab Results (last 24 hours)       ** No results found for the last 24 hours. **            Imaging Results (Last 24 Hours)       ** No results found for the last 24 hours. **            EKG      I personally viewed and interpreted the patient's EKG/Telemetry data:    ECHOCARDIOGRAM:  Results for orders placed during the " hospital encounter of 05/29/25    Adult Transthoracic Echo Complete W/ Cont if Necessary Per Protocol (With Agitated Saline)    Interpretation Summary    Left ventricular systolic function is normal. Left ventricular ejection fraction appears to be 56 - 60%.    Left ventricular diastolic function was normal.    Saline test results are negative.       STRESS MYOVIEW:       CARDIAC CATHETERIZATION:  Results for orders placed during the hospital encounter of 03/17/20    Cardiac Catheterization/Vascular Study    Narrative  CARDIAC CATHETERIZATION REPORT      DATE OF PROCEDURE:  3/17/2020    INDICATION FOR PROCEDURE:    Abnormal stress test  Chest pain  Shortness of breath    PROCEDURE PERFORMED:    Left heart catheterization  coronary angiography  left ventriculography    PROCEDURE COMMENTS:    After informed consent was obtained, the patient was prepped and draped in the usual sterile manner.  Mild to moderate sedation was administered.  Right femoral artery was accessed without difficulty and 6 Barb[place] arterial sheath was inserted.  Sheath was flushed with heparinized saline.    Using 6 Barb[place] Kylah catheters, first left coronary artery and the right coronary was electively engaged and appropriate views were taken.  A 6 Barb[place] JR4 catheter was used to cross aortic valve and left heart catheterization was performed.  Left ventriculography was done in a review.    Patient tolerated the procedure well.    FINDINGS:    1. HEMODYNAMICS:    Aortic pressure: 122/65 mmHg    LVEDP: 5 mmHg    Gradient across aortic valve on pullback: No gradient across aortic valve      2. LEFT VENTRICULOGRAPHY: 55%      3. CORONARY ANGIOGRAPHY:    A: Left main coronary artery: Normal    B: Left anterior descending artery: Minimal plaquing noted in proximal and mid LAD.  No high-grade stenosis    C: Left circumflex coronary artery: 10% plaquing in the mid LCx    D: Right coronary artery: 20 to 25% plaque in the proximal segment of RCA.  RCA is  dominant vessel    SUMMARY:    Minimal coronary artery disease  Preserved left ventricular function    RECOMMENDATIONS:    Medical treatment       OTHER:         Assessment & Plan     Acute stroke  Patient presented with acute stroke in the right MCA territory and is seen by neurologist and had an echocardiogram which showed normal LV systolic function  Patient also had an MRI of the brain and CT of the head which showed moderate stenosis of the right internal carotid artery and is on medical therapy  Patient pending discharge, can perform BAHMAN as outpatient, discussion on timing with performing physician's   Continue antiplatelets and antihypertensives along with high intensity statin therapy    Coronary artery disease  Patient had border elevated troponin which could be secondary to hypertensive emergency but only has nonobstructive disease historically, outpatient workup once medically stable status post CVA    Hypertension  Patient blood pressure is very high initially and he is currently on amlodipine losartan.  Continue to titrate for goal blood pressure less than 130 systolic    Hyperlipidemia  Patient is on high-dose statins now.  CVA as well as carotid disease  Goal LDL less than 55    Further recommendation to follow findings and clinical course  Spencer Miller MD, PhD        Spencer Miller MD  06/02/25  11:49 EDT

## 2025-06-02 NOTE — PLAN OF CARE
"Goal Outcome Evaluation:  Plan of Care Reviewed With: patient, spouse   Assessment: Tyson Rosario presents with functional mobility impairments which indicate the need for skilled intervention. Pt awaiting BAHMAN.  MRI on arrival to [facility] shows \"Multiple areas of acute infarct are present likely within a right MCA distribution with prominent perirolandic and right temporal lobe involvement. Pt agitated as he is going through alcohol and nicotine withdrawal. Supportive spouse at bedside encouraging pt appropriately. Pt ambulated with wide base of support and ataxic gait pattern.  Tolerating session today without incident. Will continue to follow and progress as tolerated.     Plan/Recommendations:   If medically appropriate, High Intensity Therapy recommended post-acute care. This is recommended as therapy feels the patient would require 5-6 days per week, 2-3 hours per day. At this time, inpatient rehabilitation (acute rehab) would be the first choice and SNF would be second. Pt requires no DME at discharge.     Pt desires Skilled Rehab placement at discharge. Pt cooperative; agreeable to therapeutic recommendations and plan of care.        Anticipated Discharge Disposition (PT): inpatient rehabilitation facility                        "

## 2025-06-02 NOTE — CASE MANAGEMENT/SOCIAL WORK
Continued Stay Note  [facility]     Patient Name: Tyson Rosario  MRN: [id number]  Today's Date: 6/1/2025    Admit Date: 5/29/2025    Plan: DC Plan: [facility], skilled - accepted. Precert not required. PASRR/LOC completed. Bed available anytime.   Discharge Plan       Row Name 06/01/25 2012       Plan    Plan DC Plan: [facility], skilled - accepted. Precert not required. PASRR/LOC completed. Bed available anytime.             Neelam Saldaña RN     Office: [phone]  Fax: [phone]

## 2025-06-02 NOTE — CASE MANAGEMENT/SOCIAL WORK
Continued Stay Note   [facility]     Patient Name: Tyson Rosario  MRN: [id number]  Today's Date: 6/2/2025    Admit Date: 5/29/2025    Plan: [facility]IAN[facility] Plan: [facility] - accepted. Precert not required. PASRR/LOC completed. Bed available anytime.   Discharge Plan       Row Name 06/02/25 1030       Plan    Plan Comments Barrier to D/C: CIWA >20               Expected Discharge Date and Time       Expected Discharge Date Expected Discharge Time    Jun 4, 2025               Nat Laws RN  RN/.  Office Ph. [phone]  Cell Ph.  [phone]

## 2025-06-02 NOTE — DISCHARGE SUMMARY
".             [facility] Medicine Services  Discharge Summary    Date of Service: 2025  Patient Name: Tyson Rosario  : 1970  MRN: [id number]    Date of Admission: 2025  Discharge Diagnosis: Stroke  Date of Discharge: 2025  Primary Care Physician: Lisa Vines MD      Presenting Problem:   Stroke [I63.9]    Active and Resolved Hospital Problems:  Active Hospital Problems    Diagnosis POA    **Stroke [I63.9] Yes      Resolved Hospital Problems   No resolved problems to display.         Hospital Course     HPI:    \"Tyson Rosario is a 54 y.o. male with a previous medical history of HTN, Alcohol abuse and Tobacco Abuse who presented to [facility] on 2025 from [facility] ED due to left leg, arm and hand numbness and weakness that started on .  Per ED report, he is noncompliant with his BP medications. \"    Hospital Course:  Patient admitted and treated for Acute to subacute strokes within the right MCA territory incoling right temporal lobe and right pre and postcentral cyri.  He was evaluated by neurology who recommended DAPT as well as cardiology consult for BAHMAN and event monitor to rule out A-fib.  Cardiology consulted and have now twice rescheduled BAHMAN, patient and spouse frustrated about this.  Consoled and inform them that they will follow-up outpatient for BAHMAN.  Psychiatry also consulted for alcohol dependence and tobacco dependence.  Prescribed Abilify naltrexone, patient adamant that he will go out and smoke.  All questions and concerns addressed.  They will drive in their own vehicle to rehab.        DISCHARGE Follow Up Recommendations for labs and diagnostics: Cardiology for outpatient BAHMAN, PCP within 1 week, stroke clinic        Day of Discharge     Vital Signs:  Temp:  [97.2 °F (36.2 °C)-98.2 °F (36.8 °C)] 98.1 °F (36.7 °C)  Heart Rate:  [85-94] 85  Resp:  [16-21] 21  BP: (142-173)/() 173/96    Physical Exam:  Physical Exam    Appearance: Normal " appearance.   Cardiovascular:      Rate and Rhythm: Normal rate and regular rhythm.   Pulmonary:      Effort: Pulmonary effort is normal.   Musculoskeletal:      Left hand: Edema present.   Skin:     General: Skin is warm and dry.   Neurological:      General: No focal deficit present.      Mental Status: He is alert and oriented to person, place, and time. Mental status is at baseline.      Motor: Weakness (Left ) present.   Psychiatric:         Mood and Affect: Mood normal.         Behavior: Behavior a little agitated      Pertinent  and/or Most Recent Results     LAB RESULTS:      Lab 06/01/25  0518 05/31/25  0147 05/30/25 0215 05/29/25 0223   WBC 7.00 5.75 4.77  --    HEMOGLOBIN 16.4 14.9 15.9  --    HEMATOCRIT 47.9 43.8 44.9  --    PLATELETS 119* 115* 111*  --    .7* 114.7* 113.4*  --    SED RATE  --   --   --  <1         Lab 05/31/25  0147 05/30/25 0215 05/29/25  1406 05/29/25 0223   SODIUM 137 141  --  142   POTASSIUM 3.8 4.0 3.6 3.4*   CHLORIDE 102 107  --  99   CO2 25.7 25.6  --  23.9   ANION GAP 9.3 8.4  --  19.1*   BUN 8.9 7.6  --  6.1   CREATININE 0.71* 0.64*  --  0.65*   EGFR 107.7 111.1  --  110.6   GLUCOSE 117* 116*  --  93   CALCIUM 8.9 8.9  --  8.9   HEMOGLOBIN A1C  --   --   --  5.29   TSH  --   --   --  3.080         Lab 05/29/25 0419   TOTAL PROTEIN 6.0   ALBUMIN 3.9   ALT (SGPT) 25   AST (SGOT) 39   BILIRUBIN 0.9   INDIRECT BILIRUBIN 0.6   BILIRUBIN DIRECT 0.3   ALK PHOS 108         Lab 05/29/25 0419 05/29/25 0223   HSTROP T 168* 163*         Lab 05/29/25 0223   CHOLESTEROL 194   LDL CHOL 122*   HDL CHOL 53   TRIGLYCERIDES 107         Lab 05/29/25  1406   VITAMIN B 12 <150*         Brief Urine Lab Results       None          Microbiology Results (last 10 days)       ** No results found for the last 240 hours. **            MRI Brain With & Without Contrast  Result Date: 5/30/2025  Impression: Impression: No significant short interval acute change. Redemonstrated and stable  appearing multifocal infarcts within the right MCA territory, without evidence of new ischemia, worsening mass effect or hemorrhage. These areas demonstrate some intrinsic cortical T1 shortening and enhancement suggesting laminar necrosis. The predominance of diffusion restriction and vascular distribution of findings strongly indicates ischemic etiology, however if there is sufficient clinical concern for intracranial infectious process, concurrent encephalitis is difficult to entirely exclude on imaging. Electronically Signed: Martin Shearer MD  5/30/2025 6:45 AM EDT  Workstation ID: [id number]    CT Chest With Contrast Diagnostic  Result Date: 5/29/2025  Impression: 1. No findings of malignancy in the chest, abdomen, and pelvis 2. Calcific bilateral pleural thickening likely related to remote hemothorax or empyema. Multiple old bilateral rib fractures are noted 3. Coronary artery atherosclerosis 4. Aortic valve calcification which can be associated with valvular aortic stenosis 5. Steatosis of the left hepatic lobe 6. Cholelithiasis with findings suggesting chronic gallbladder disease Electronically Signed: Anoop Ashford  5/29/2025 3:10 PM EDT  Workstation ID: [id number]    CT Abdomen Pelvis With Contrast  Result Date: 5/29/2025  Impression: 1. No findings of malignancy in the chest, abdomen, and pelvis 2. Calcific bilateral pleural thickening likely related to remote hemothorax or empyema. Multiple old bilateral rib fractures are noted 3. Coronary artery atherosclerosis 4. Aortic valve calcification which can be associated with valvular aortic stenosis 5. Steatosis of the left hepatic lobe 6. Cholelithiasis with findings suggesting chronic gallbladder disease Electronically Signed: Anoop Ashford  5/29/2025 3:10 PM EDT  Workstation ID: [id number]    MRI Brain Without Contrast  Result Date: 5/29/2025  Impression: Impression: Multiple areas of acute infarct are present likely within a right MCA distribution with  prominent perirolandic and right temporal lobe involvement. There is no evidence of associated mass effect or hemorrhage. Electronically Signed: Martin Shearer MD  5/29/2025 6:38 AM EDT  Workstation ID: [id number]      Results for orders placed during the hospital encounter of 05/29/25    Duplex Carotid Ultrasound CAR    Interpretation Summary    Normal right carotid duplex scan.    Minimal atherosclerotic plaque left internal carotid artery with less than 50% ICA stenosis.    Vertebral arteries patent with antegrade flow bilaterally.      Results for orders placed during the hospital encounter of 05/29/25    Duplex Carotid Ultrasound CAR    Interpretation Summary    Normal right carotid duplex scan.    Minimal atherosclerotic plaque left internal carotid artery with less than 50% ICA stenosis.    Vertebral arteries patent with antegrade flow bilaterally.      Results for orders placed during the hospital encounter of 05/29/25    Adult Transthoracic Echo Complete W/ Cont if Necessary Per Protocol (With Agitated Saline)    Interpretation Summary    Left ventricular systolic function is normal. Left ventricular ejection fraction appears to be 56 - 60%.    Left ventricular diastolic function was normal.    Saline test results are negative.      Labs Pending at Discharge:  Pending Results       Procedure [Order ID] Specimen - Date/Time    Adult Transesophageal Echo (BAHMAN) W/ Cont if Necessary Per Protocol [[id number]]     Potassium [[id number]]     Specimen: Blood             Procedures Performed           Consults:   Consults       Date and Time Order Name Status Description    5/31/2025 12:13 PM Inpatient Psychiatrist Consult Completed     5/29/2025 11:27 AM Inpatient Cardiology Consult Completed     5/29/2025  1:19 AM Inpatient Neurology Consult Stroke Completed               Discharge Details        Discharge Medications        New Medications        Instructions Start Date   amLODIPine 5 MG tablet  Commonly known as:  NORVASC   5 mg, Oral, Every 24 Hours Scheduled      ARIPiprazole 2 MG tablet  Commonly known as: ABILIFY   2 mg, Oral, Daily   Start Date: Juju 3, 2025     atorvastatin 80 MG tablet  Commonly known as: LIPITOR   80 mg, Oral, Nightly      clopidogrel 75 MG tablet  Commonly known as: PLAVIX   75 mg, Oral, Daily             Continue These Medications        Instructions Start Date   furosemide 20 MG tablet  Commonly known as: LASIX   20 mg, Oral, Daily      Trelegy Ellipta 100-62.5-25 MCG/INH inhaler  Generic drug: Fluticasone-Umeclidin-Vilant   1 puff, Inhalation, Daily             Stop These Medications      ibuprofen 200 MG tablet  Commonly known as: ADVIL,MOTRIN     lisinopril 10 MG tablet  Commonly known as: PRINIVIL,ZESTRIL     lovastatin 20 MG tablet  Commonly known as: MEVACOR              No Known Allergies      Discharge Disposition: Rehab  Rehab Facility or Unit (DC - External)    Diet:  Hospital:  Diet Order   Procedures    Diet: Cardiac; Healthy Heart (2-3 Na+); Fluid Consistency: Thin (IDDSI 0)         Discharge Activity:         CODE STATUS:  Code Status and Medical Interventions: CPR (Attempt to Resuscitate); Full Support   Ordered at: 05/29/25 0119     Code Status (Patient has no pulse and is not breathing):    CPR (Attempt to Resuscitate)     Medical Interventions (Patient has pulse or is breathing):    Full Support         No future appointments.    Additional Instructions for the Follow-ups that You Need to Schedule       Ambulatory Referral to Neurology   As directed      Requested service: [facility]                Time spent on Discharge including face to face service: 40 minutes    Signature: Electronically signed by Kevin Thorne MD, 06/02/25, 11:09 EDT.  [facility] Hospitalist Team

## 2025-06-02 NOTE — THERAPY TREATMENT NOTE
"Subjective: Pt agreeable to therapeutic plan of care.    Objective:     Precautions - cardiac, seizures    Bed mobility - N/A or Not attempted.  Transfers - CGA/min assist  Ambulation - 25 feet x 3 trials Min-A    Therapeutic Exercise - sit to stands x 8 reps from edge of bed,  B LE AROM unsupported sitting / EOB and standing    Vitals: WNL    Pain: 4 Hurley-Baker Location: generalized  Intervention for pain: Repositioned and Increased Activity    Education: Verbal/Tactile Cues    Assessment: Tyson Rosario presents with functional mobility impairments which indicate the need for skilled intervention. Pt awaiting BAHMAN.  MRI on arrival to [facility] shows \"Multiple areas of acute infarct are present likely within a right MCA distribution with prominent perirolandic and right temporal lobe involvement. Pt agitated as he is going through alcohol and nicotine withdrawal. Supportive spouse at bedside encouraging pt appropriately. Pt ambulated with wide base of support and ataxic gait pattern.  Tolerating session today without incident. Will continue to follow and progress as tolerated.     Plan/Recommendations:   If medically appropriate, High Intensity Therapy recommended post-acute care. This is recommended as therapy feels the patient would require 5-6 days per week, 2-3 hours per day. At this time, inpatient rehabilitation (acute rehab) would be the first choice and SNF would be second. Pt requires no DME at discharge.     Pt desires Skilled Rehab placement at discharge. Pt cooperative; agreeable to therapeutic recommendations and plan of care.         Basic Mobility 6-click:  Rollin = Total, A lot = 2, A little = 3; 4 = None  Supine>Sit:   1 = Total, A lot = 2, A little = 3; 4 = None   Sit>Stand with arms:  1 = Total, A lot = 2, A little = 3; 4 = None  Bed>Chair:   1 = Total, A lot = 2, A little = 3; 4 = None  Ambulate in room:  1 = Total, A lot = 2, A little = 3; 4 = None  3-5 Steps with railin = Total, A lot = " 2, A little = 3; 4 = None  Score: 18    Modified Tavares: 4 = Moderately severe disability (Unable to attend to own bodily needs without assistance, and unable to walk unassisted)     Post-Tx Position: Supine with HOB Elevated, Alarms activated, and Call light and personal items within reach  PPE: gloves    Therapy Charges for Today       Code Description Service Date Service Provider Modifiers Qty    19433007010 HC GAIT TRAINING EA 15 MIN 6/2/2025 Liv Licona, PT GP 1    [id number]  PT THER PROC EA 15 MIN 6/2/2025 Liv Licona, PT GP 1           PT Charges       Row Name 06/02/25 1125             Time Calculation    Start Time 0905  -BR      Stop Time 0927  -BR      Time Calculation (min) 22 min  -BR      PT Received On 06/02/25  -BR      PT - Next Appointment 06/03/25  -BR         Time Calculation- PT    Total Timed Code Minutes- PT 22 minute(s)  -BR                User Key  (r) = Recorded By, (t) = Taken By, (c) = Cosigned By      Initials Name Provider Type    BR Liv Licona, PT Physical Therapist

## 2025-06-02 NOTE — PLAN OF CARE
Problem: Comorbidity Management  Goal: Maintenance of Asthma Control  6/2/2025 1102 by Nicolette Fernandez RN  Outcome: Adequate for Care Transition  6/2/2025 1102 by Nicolette Fernandez RN  Outcome: Progressing  Intervention: Maintain Asthma Symptom Control  Recent Flowsheet Documentation  Taken 6/2/2025 0757 by Nicolette Fernandez RN  Medication Review/Management: medications reviewed  Goal: Maintenance of Behavioral Health Symptom Control  6/2/2025 1102 by Nicolette Fernandez RN  Outcome: Adequate for Care Transition  6/2/2025 1102 by Nicolette Fernandez RN  Outcome: Progressing  Intervention: Maintain Behavioral Health Symptom Control  Recent Flowsheet Documentation  Taken 6/2/2025 0757 by Nicolette Fernandez RN  Medication Review/Management: medications reviewed  Goal: Maintenance of COPD Symptom Control  6/2/2025 1102 by Nicolette Fernandez RN  Outcome: Adequate for Care Transition  6/2/2025 1102 by Nicolette Fernandez RN  Outcome: Progressing  Intervention: Maintain COPD (Chronic Obstructive Pulmonary Disease) Symptom Control  Recent Flowsheet Documentation  Taken 6/2/2025 0757 by Nicolette Fernandez RN  Medication Review/Management: medications reviewed  Goal: Blood Glucose Level Within Target Range  6/2/2025 1102 by Nicolette Fernandez RN  Outcome: Adequate for Care Transition  6/2/2025 1102 by Nicolette Fernandez RN  Outcome: Progressing  Intervention: Monitor and Manage Glycemia  Recent Flowsheet Documentation  Taken 6/2/2025 0757 by Nicolette Fernandez RN  Medication Review/Management: medications reviewed  Goal: Maintenance of Heart Failure Symptom Control  6/2/2025 1102 by Nicolette Fernandez RN  Outcome: Adequate for Care Transition  6/2/2025 1102 by Nicolette Fernandez RN  Outcome: Progressing  Intervention: Maintain Heart Failure Management  Recent Flowsheet Documentation  Taken 6/2/2025 0757 by Nicolette Fernandez RN  Medication Review/Management: medications reviewed  Goal: Blood Pressure in Desired Range  6/2/2025 1102 by Nicolette Fernandez  RN  Outcome: Adequate for Care Transition  6/2/2025 1102 by Nicolette Fernandez RN  Outcome: Progressing  Intervention: Maintain Blood Pressure Management  Recent Flowsheet Documentation  Taken 6/2/2025 0757 by Nicolette Fernandez RN  Medication Review/Management: medications reviewed  Goal: Maintenance of Osteoarthritis Symptom Control  6/2/2025 1102 by Nicolette Fernandez RN  Outcome: Adequate for Care Transition  6/2/2025 1102 by Nicolette Fernandez RN  Outcome: Progressing  Intervention: Maintain Osteoarthritis Symptom Control  Recent Flowsheet Documentation  Taken 6/2/2025 0757 by Nicolette Fernandez RN  Activity Management: ambulated outside room  Assistive Device Utilized: walker  Medication Review/Management: medications reviewed  Goal: Bariatric Home Regimen Maintained  6/2/2025 1102 by Nicolette Fernandez RN  Outcome: Adequate for Care Transition  6/2/2025 1102 by Nicolette Fernandez RN  Outcome: Progressing  Intervention: Maintain and Manage Postbariatric Surgery Care  Recent Flowsheet Documentation  Taken 6/2/2025 0757 by Nicolette Fernandez RN  Medication Review/Management: medications reviewed  Goal: Maintenance of Seizure Control  6/2/2025 1102 by Nicolette Fernandez RN  Outcome: Adequate for Care Transition  6/2/2025 1102 by Nicolette Fernandez RN  Outcome: Progressing  Intervention: Maintain Seizure Symptom Control  Recent Flowsheet Documentation  Taken 6/2/2025 0757 by Nicolette Fernandez RN  Medication Review/Management: medications reviewed     Problem: Violence Risk or Actual  Goal: Anger and Impulse Control  6/2/2025 1102 by Nicolette Fernandez RN  Outcome: Adequate for Care Transition  6/2/2025 1102 by Nicolette Fernandez RN  Outcome: Progressing  Intervention: Minimize Safety Risk  Recent Flowsheet Documentation  Taken 6/2/2025 0757 by Nicolette Fernandez RN  Enhanced Safety Measures: bed alarm refused  Intervention: Promote Self-Control  Recent Flowsheet Documentation  Taken 6/2/2025 0757 by Nicolette Fernandez RN  Supportive  Measures:   active listening utilized   relaxation techniques promoted  Environmental Support: calm environment promoted     Problem: Fall Injury Risk  Goal: Absence of Fall and Fall-Related Injury  6/2/2025 1102 by Nicolette Fernandez RN  Outcome: Adequate for Care Transition  6/2/2025 1102 by Nicolette Fernandez RN  Outcome: Progressing  Intervention: Identify and Manage Contributors  Recent Flowsheet Documentation  Taken 6/2/2025 0757 by Nicolette Fernandez RN  Medication Review/Management: medications reviewed  Intervention: Promote Injury-Free Environment  Recent Flowsheet Documentation  Taken 6/2/2025 0757 by Nicolette Fernandez RN  Safety Promotion/Fall Prevention:   activity supervised   assistive device/personal items within reach   clutter free environment maintained   fall prevention program maintained   nonskid shoes/slippers when out of bed   room organization consistent   safety round/check completed     Problem: Alcohol Withdrawal  Goal: Alcohol Withdrawal Symptom Control  6/2/2025 1102 by Nicolette Fernandez RN  Outcome: Adequate for Care Transition  6/2/2025 1102 by Nicolette Fernandez RN  Outcome: Progressing  Goal: Optimal Neurologic Function  6/2/2025 1102 by Nicolette Fernandez RN  Outcome: Adequate for Care Transition  6/2/2025 1102 by Nicolette Fernandez RN  Outcome: Progressing  Intervention: Minimize or Manage Acute Neurologic Symptoms  Recent Flowsheet Documentation  Taken 6/2/2025 0757 by Nicolette Fernandez RN  Cerebral Perfusion Promotion: blood pressure monitored  Goal: Readiness for Change Identified  6/2/2025 1102 by Nicolette Fenrandez RN  Outcome: Adequate for Care Transition  6/2/2025 1102 by Nicolette Fernandez RN  Outcome: Progressing  Intervention: Promote Psychosocial Wellbeing  Recent Flowsheet Documentation  Taken 6/2/2025 0757 by Nicolette Fernandez RN  Family/Support System Care: support provided  Intervention: Partner to Facilitate Behavior Change  Recent Flowsheet Documentation  Taken 6/2/2025 0757 by  Nicolette Fernandez, RN  Supportive Measures:   active listening utilized   relaxation techniques promoted   Goal Outcome Evaluation:

## 2025-06-03 NOTE — CASE MANAGEMENT/SOCIAL WORK
Case Management Discharge Note      Final Note: [facility]         Selected Continued Care - Discharged on 6/2/2025 Admission date: 5/29/2025 - Discharge disposition: Rehab Facility or Unit (DC - External)      Destination Coordination complete.      Service Provider Services Address Phone Fax Patient Preferred    [facility] [address], [place] [phone] --                 Transportation Services  Private: Car    Final Discharge Disposition Code: 03 - skilled nursing facility (SNF)

## 2025-06-03 NOTE — PAYOR COMM NOTE
"Discharge notification for case# [id number]     ===========    THANK YOU,    OLIVIA Diane, RN  Utilization Review  [facility]  Phone: [phone]  Fax: [phone]      NPI: [id number]  Tax ID: [id number]        Tyson Rosario (54 y.o. Male)       Date of Birth   1970    Social Security Number       Address   [address] [place] [address]    Home Phone   [phone]    MRN   [id number]       Shinto   None    Marital Status   Single                            Admission Date   2025    Admission Type   Urgent    Admitting Provider   Ar Younger MD    Attending Provider       Department, Room/Bed   [facility] NEURO,        Discharge Date   2025    Discharge Disposition   Rehab Facility or Unit (DC - External)    Discharge Destination                                 Attending Provider: (none)   Allergies: No Known Allergies    Isolation: None   Infection: None   Code Status: Prior    Ht: 182.9 cm (72\")   Wt: 96.6 kg (213 lb)    Admission Cmt: None   Principal Problem: Stroke [I63.9]                   Active Insurance as of 2025       Primary Coverage       Payor Plan Insurance Group Employer/Plan Group    [place]        Payor Plan Address Payor Plan Phone Number Payor Plan Fax Number Effective Dates    [address] [address]   2025 - None Entered    [place] [address]         Subscriber Name Subscriber Birth Date Member ID       TYSON ROSARIO 1970 [id number]                     Emergency Contacts        (Rel.) Home Phone Work Phone Mobile Phone    TIAGO BRONSON (Significant Other) [phone] -- --                 Discharge Summary        Kevin Thorne MD at 25 1109          .             [facility] Medicine Services  Discharge Summary    Date of Service: 2025  Patient Name: Tyson Rosario  : 1970  MRN: [id number]    Date of Admission: 2025  Discharge Diagnosis: " "Stroke  Date of Discharge: 6/2/2025  Primary Care Physician: Lisa Vines MD      Presenting Problem:   Stroke [I63.9]    Active and Resolved Hospital Problems:  Active Hospital Problems    Diagnosis POA    **Stroke [I63.9] Yes      Resolved Hospital Problems   No resolved problems to display.         Hospital Course     HPI:    \"Tyson Rosario is a 54 y.o. male with a previous medical history of HTN, Alcohol abuse and Tobacco Abuse who presented to [facility] on 5/29/2025 from [facility] ED due to left leg, arm and hand numbness and weakness that started on Sunday.  Per ED report, he is noncompliant with his BP medications. \"    Hospital Course:  Patient admitted and treated for Acute to subacute strokes within the right MCA territory incoling right temporal lobe and right pre and postcentral cyri.  He was evaluated by neurology who recommended DAPT as well as cardiology consult for BAHMAN and event monitor to rule out A-fib.  Cardiology consulted and have now twice rescheduled BAHMAN, patient and spouse frustrated about this.  Consoled and inform them that they will follow-up outpatient for BAHMAN.  Psychiatry also consulted for alcohol dependence and tobacco dependence.  Prescribed Abilify naltrexone, patient adamant that he will go out and smoke.  All questions and concerns addressed.  They will drive in their own vehicle to rehab.        DISCHARGE Follow Up Recommendations for labs and diagnostics: Cardiology for outpatient BAHMAN, PCP within 1 week, stroke clinic        Day of Discharge     Vital Signs:  Temp:  [97.2 °F (36.2 °C)-98.2 °F (36.8 °C)] 98.1 °F (36.7 °C)  Heart Rate:  [85-94] 85  Resp:  [16-21] 21  BP: (142-173)/() 173/96    Physical Exam:  Physical Exam    Appearance: Normal appearance.   Cardiovascular:      Rate and Rhythm: Normal rate and regular rhythm.   Pulmonary:      Effort: Pulmonary effort is normal.   Musculoskeletal:      Left hand: Edema present.   Skin:     General: Skin " is warm and dry.   Neurological:      General: No focal deficit present.      Mental Status: He is alert and oriented to person, place, and time. Mental status is at baseline.      Motor: Weakness (Left ) present.   Psychiatric:         Mood and Affect: Mood normal.         Behavior: Behavior a little agitated      Pertinent  and/or Most Recent Results     LAB RESULTS:      Lab 06/01/25  0518 05/31/25  0147 05/30/25 0215 05/29/25 0223   WBC 7.00 5.75 4.77  --    HEMOGLOBIN 16.4 14.9 15.9  --    HEMATOCRIT 47.9 43.8 44.9  --    PLATELETS 119* 115* 111*  --    .7* 114.7* 113.4*  --    SED RATE  --   --   --  <1         Lab 05/31/25  0147 05/30/25 0215 05/29/25  1406 05/29/25 0223   SODIUM 137 141  --  142   POTASSIUM 3.8 4.0 3.6 3.4*   CHLORIDE 102 107  --  99   CO2 25.7 25.6  --  23.9   ANION GAP 9.3 8.4  --  19.1*   BUN 8.9 7.6  --  6.1   CREATININE 0.71* 0.64*  --  0.65*   EGFR 107.7 111.1  --  110.6   GLUCOSE 117* 116*  --  93   CALCIUM 8.9 8.9  --  8.9   HEMOGLOBIN A1C  --   --   --  5.29   TSH  --   --   --  3.080         Lab 05/29/25 0419   TOTAL PROTEIN 6.0   ALBUMIN 3.9   ALT (SGPT) 25   AST (SGOT) 39   BILIRUBIN 0.9   INDIRECT BILIRUBIN 0.6   BILIRUBIN DIRECT 0.3   ALK PHOS 108         Lab 05/29/25  0419 05/29/25 0223   HSTROP T 168* 163*         Lab 05/29/25 0223   CHOLESTEROL 194   LDL CHOL 122*   HDL CHOL 53   TRIGLYCERIDES 107         Lab 05/29/25  1406   VITAMIN B 12 <150*         Brief Urine Lab Results       None          Microbiology Results (last 10 days)       ** No results found for the last 240 hours. **            MRI Brain With & Without Contrast  Result Date: 5/30/2025  Impression: Impression: No significant short interval acute change. Redemonstrated and stable appearing multifocal infarcts within the right MCA territory, without evidence of new ischemia, worsening mass effect or hemorrhage. These areas demonstrate some intrinsic cortical T1 shortening and enhancement  suggesting laminar necrosis. The predominance of diffusion restriction and vascular distribution of findings strongly indicates ischemic etiology, however if there is sufficient clinical concern for intracranial infectious process, concurrent encephalitis is difficult to entirely exclude on imaging. Electronically Signed: Martin Shearer MD  5/30/2025 6:45 AM EDT  Workstation ID: [id number]    CT Chest With Contrast Diagnostic  Result Date: 5/29/2025  Impression: 1. No findings of malignancy in the chest, abdomen, and pelvis 2. Calcific bilateral pleural thickening likely related to remote hemothorax or empyema. Multiple old bilateral rib fractures are noted 3. Coronary artery atherosclerosis 4. Aortic valve calcification which can be associated with valvular aortic stenosis 5. Steatosis of the left hepatic lobe 6. Cholelithiasis with findings suggesting chronic gallbladder disease Electronically Signed: Anoop Ashford  5/29/2025 3:10 PM EDT  Workstation ID: [id number]    CT Abdomen Pelvis With Contrast  Result Date: 5/29/2025  Impression: 1. No findings of malignancy in the chest, abdomen, and pelvis 2. Calcific bilateral pleural thickening likely related to remote hemothorax or empyema. Multiple old bilateral rib fractures are noted 3. Coronary artery atherosclerosis 4. Aortic valve calcification which can be associated with valvular aortic stenosis 5. Steatosis of the left hepatic lobe 6. Cholelithiasis with findings suggesting chronic gallbladder disease Electronically Signed: Anoop Ashford  5/29/2025 3:10 PM EDT  Workstation ID: [id number]    MRI Brain Without Contrast  Result Date: 5/29/2025  Impression: Impression: Multiple areas of acute infarct are present likely within a right MCA distribution with prominent perirolandic and right temporal lobe involvement. There is no evidence of associated mass effect or hemorrhage. Electronically Signed: Martin Shearer MD  5/29/2025 6:38 AM EDT  Workstation ID:  [id number]      Results for orders placed during the hospital encounter of 05/29/25    Duplex Carotid Ultrasound CAR    Interpretation Summary    Normal right carotid duplex scan.    Minimal atherosclerotic plaque left internal carotid artery with less than 50% ICA stenosis.    Vertebral arteries patent with antegrade flow bilaterally.      Results for orders placed during the hospital encounter of 05/29/25    Duplex Carotid Ultrasound CAR    Interpretation Summary    Normal right carotid duplex scan.    Minimal atherosclerotic plaque left internal carotid artery with less than 50% ICA stenosis.    Vertebral arteries patent with antegrade flow bilaterally.      Results for orders placed during the hospital encounter of 05/29/25    Adult Transthoracic Echo Complete W/ Cont if Necessary Per Protocol (With Agitated Saline)    Interpretation Summary    Left ventricular systolic function is normal. Left ventricular ejection fraction appears to be 56 - 60%.    Left ventricular diastolic function was normal.    Saline test results are negative.      Labs Pending at Discharge:  Pending Results       Procedure [Order ID] Specimen - Date/Time    Adult Transesophageal Echo (BAHMAN) W/ Cont if Necessary Per Protocol [[id number]]     Potassium [[id number]]     Specimen: Blood             Procedures Performed           Consults:   Consults       Date and Time Order Name Status Description    5/31/2025 12:13 PM Inpatient Psychiatrist Consult Completed     5/29/2025 11:27 AM Inpatient Cardiology Consult Completed     5/29/2025  1:19 AM Inpatient Neurology Consult Stroke Completed               Discharge Details        Discharge Medications        New Medications        Instructions Start Date   amLODIPine 5 MG tablet  Commonly known as: NORVASC   5 mg, Oral, Every 24 Hours Scheduled      ARIPiprazole 2 MG tablet  Commonly known as: ABILIFY   2 mg, Oral, Daily   Start Date: Juju 3, 2025     atorvastatin 80 MG tablet  Commonly known as:  LIPITOR   80 mg, Oral, Nightly      clopidogrel 75 MG tablet  Commonly known as: PLAVIX   75 mg, Oral, Daily             Continue These Medications        Instructions Start Date   furosemide 20 MG tablet  Commonly known as: LASIX   20 mg, Oral, Daily      Trelegy Ellipta 100-62.5-25 MCG/INH inhaler  Generic drug: Fluticasone-Umeclidin-Vilant   1 puff, Inhalation, Daily             Stop These Medications      ibuprofen 200 MG tablet  Commonly known as: ADVIL,MOTRIN     lisinopril 10 MG tablet  Commonly known as: PRINIVIL,ZESTRIL     lovastatin 20 MG tablet  Commonly known as: MEVACOR              No Known Allergies      Discharge Disposition: Rehab  Rehab Facility or Unit (DC - External)    Diet:  Hospital:  Diet Order   Procedures    Diet: Cardiac; Healthy Heart (2-3 Na+); Fluid Consistency: Thin (IDDSI 0)         Discharge Activity:         CODE STATUS:  Code Status and Medical Interventions: CPR (Attempt to Resuscitate); Full Support   Ordered at: 05/29/25 0119     Code Status (Patient has no pulse and is not breathing):    CPR (Attempt to Resuscitate)     Medical Interventions (Patient has pulse or is breathing):    Full Support         No future appointments.    Additional Instructions for the Follow-ups that You Need to Schedule       Ambulatory Referral to Neurology   As directed      Requested service: [facility]                Time spent on Discharge including face to face service: 40 minutes    Signature: Electronically signed by Kevin Kuhn MD, 06/02/25, 11:09 EDT.  [facility] Hospitalist Team      Electronically signed by Kevin Kuhn MD at 06/02/25 1112       Discharge Order (From admission, onward)       Start     Ordered    06/02/25 1054  Discharge patient  Once        Expected Discharge Date: 06/02/25   Discharge Disposition: Rehab Facility or Unit (DC - External)   Physician of Record for Attribution - Please select from Treatment Team: KEVIN KUHN [[id number]]   Review needed by  CMO to determine Physician of Record: No      Question Answer Comment   Physician of Record for Attribution - Please select from Treatment Team SUSAN KUHN    Review needed by CMO to determine Physician of Record No        06/02/25 1051

## 2025-06-19 ENCOUNTER — HOSPITAL ENCOUNTER (OUTPATIENT)
Dept: CARDIOLOGY | Facility: HOSPITAL | Age: 55
Discharge: HOME | End: 2025-06-19
Payer: OTHER GOVERNMENT

## 2025-06-26 ENCOUNTER — HOSPITAL ENCOUNTER (OUTPATIENT)
Dept: CARDIOLOGY | Facility: HOSPITAL | Age: 55
Discharge: HOME | End: 2025-06-26
Payer: OTHER GOVERNMENT

## 2025-06-26 ENCOUNTER — ANESTHESIA EVENT (OUTPATIENT)
Dept: CARDIOLOGY | Facility: HOSPITAL | Age: 55
End: 2025-06-26
Payer: OTHER GOVERNMENT

## 2025-06-26 ENCOUNTER — ANESTHESIA (OUTPATIENT)
Dept: CARDIOLOGY | Facility: HOSPITAL | Age: 55
End: 2025-06-26
Payer: OTHER GOVERNMENT

## 2025-06-26 VITALS
OXYGEN SATURATION: 98 % | HEART RATE: 80 BPM | HEIGHT: 72 IN | WEIGHT: 206.35 LBS | RESPIRATION RATE: 16 BRPM | SYSTOLIC BLOOD PRESSURE: 110 MMHG | TEMPERATURE: 98.5 F | BODY MASS INDEX: 27.95 KG/M2 | DIASTOLIC BLOOD PRESSURE: 73 MMHG

## 2025-06-26 DIAGNOSIS — I63.9 CEREBROVASCULAR ACCIDENT (CVA), UNSPECIFIED MECHANISM: ICD-10-CM

## 2025-06-26 LAB
ANION GAP SERPL CALCULATED.3IONS-SCNC: 11.6 MMOL/L (ref 5–15)
BUN SERPL-MCNC: 13.6 MG/DL (ref 6–20)
BUN/CREAT SERPL: 18.9 (ref 7–25)
CALCIUM SPEC-SCNC: 10 MG/DL (ref 8.6–10.5)
CHLORIDE SERPL-SCNC: 102 MMOL/L (ref 98–107)
CO2 SERPL-SCNC: 26.4 MMOL/L (ref 22–29)
CREAT SERPL-MCNC: 0.72 MG/DL (ref 0.76–1.27)
DEPRECATED RDW RBC AUTO: 58.1 FL (ref 37–54)
EGFRCR SERPLBLD CKD-EPI 2021: 107.2 ML/MIN/1.73
ERYTHROCYTE [DISTWIDTH] IN BLOOD BY AUTOMATED COUNT: 14.6 % (ref 12.3–15.4)
GLUCOSE SERPL-MCNC: 128 MG/DL (ref 65–99)
HCT VFR BLD AUTO: 43 % (ref 37.5–51)
HGB BLD-MCNC: 14.5 G/DL (ref 13–17.7)
MCH RBC QN AUTO: 36 PG (ref 26.6–33)
MCHC RBC AUTO-ENTMCNC: 33.7 G/DL (ref 31.5–35.7)
MCV RBC AUTO: 106.7 FL (ref 79–97)
PLATELET # BLD AUTO: 193 10*3/MM3 (ref 140–450)
PMV BLD AUTO: 11.2 FL (ref 6–12)
POTASSIUM SERPL-SCNC: 4.1 MMOL/L (ref 3.5–5.2)
RBC # BLD AUTO: 4.03 10*6/MM3 (ref 4.14–5.8)
SODIUM SERPL-SCNC: 140 MMOL/L (ref 136–145)
WBC NRBC COR # BLD AUTO: 7.59 10*3/MM3 (ref 3.4–10.8)

## 2025-06-26 PROCEDURE — 25010000002 PROPOFOL 10 MG/ML EMULSION: Performed by: NURSE ANESTHETIST, CERTIFIED REGISTERED

## 2025-06-26 PROCEDURE — 25010000002 LIDOCAINE PF 2% 2 % SOLUTION: Performed by: NURSE ANESTHETIST, CERTIFIED REGISTERED

## 2025-06-26 PROCEDURE — 93312 ECHO TRANSESOPHAGEAL: CPT

## 2025-06-26 PROCEDURE — 80048 BASIC METABOLIC PNL TOTAL CA: CPT

## 2025-06-26 PROCEDURE — 93320 DOPPLER ECHO COMPLETE: CPT

## 2025-06-26 PROCEDURE — 93325 DOPPLER ECHO COLOR FLOW MAPG: CPT

## 2025-06-26 PROCEDURE — 85027 COMPLETE CBC AUTOMATED: CPT

## 2025-06-26 PROCEDURE — 93321 DOPPLER ECHO F-UP/LMTD STD: CPT

## 2025-06-26 RX ORDER — GABAPENTIN 100 MG/1
100 CAPSULE ORAL 3 TIMES DAILY
COMMUNITY

## 2025-06-26 RX ORDER — SODIUM CHLORIDE 9 MG/ML
INJECTION, SOLUTION INTRAVENOUS CONTINUOUS PRN
Status: DISCONTINUED | OUTPATIENT
Start: 2025-06-26 | End: 2025-06-26 | Stop reason: SURG

## 2025-06-26 RX ORDER — TRAZODONE HYDROCHLORIDE 50 MG/1
50 TABLET ORAL NIGHTLY
COMMUNITY

## 2025-06-26 RX ORDER — TRAMADOL HYDROCHLORIDE 50 MG/1
50 TABLET ORAL 2 TIMES DAILY
COMMUNITY

## 2025-06-26 RX ORDER — POTASSIUM CHLORIDE 1.5 G/1.58G
20 POWDER, FOR SOLUTION ORAL DAILY
COMMUNITY

## 2025-06-26 RX ORDER — LIDOCAINE HYDROCHLORIDE 20 MG/ML
INJECTION, SOLUTION EPIDURAL; INFILTRATION; INTRACAUDAL; PERINEURAL AS NEEDED
Status: DISCONTINUED | OUTPATIENT
Start: 2025-06-26 | End: 2025-06-26 | Stop reason: SURG

## 2025-06-26 RX ORDER — PROPOFOL 10 MG/ML
VIAL (ML) INTRAVENOUS AS NEEDED
Status: DISCONTINUED | OUTPATIENT
Start: 2025-06-26 | End: 2025-06-26 | Stop reason: SURG

## 2025-06-26 RX ADMIN — SODIUM CHLORIDE: 9 INJECTION, SOLUTION INTRAVENOUS at 09:04

## 2025-06-26 RX ADMIN — PROPOFOL 30 MG: 10 INJECTION, EMULSION INTRAVENOUS at 09:08

## 2025-06-26 RX ADMIN — PROPOFOL 30 MG: 10 INJECTION, EMULSION INTRAVENOUS at 09:10

## 2025-06-26 RX ADMIN — LIDOCAINE HYDROCHLORIDE 80 MG: 20 INJECTION, SOLUTION EPIDURAL; INFILTRATION; INTRACAUDAL; PERINEURAL at 09:06

## 2025-06-26 RX ADMIN — PROPOFOL 100 MG: 10 INJECTION, EMULSION INTRAVENOUS at 09:06

## 2025-06-26 NOTE — ANESTHESIA PREPROCEDURE EVALUATION
Anesthesia Evaluation     Patient summary reviewed   no history of anesthetic complications:   NPO Solid Status: > 8 hours  NPO Liquid Status: > 8 hours           Airway   Dental      Pulmonary    (+) ,shortness of breath  Cardiovascular     ECG reviewed    (+) CAD, CHF Diastolic >=55%  (-) angina, cardiac stents      Neuro/Psych  (+) CVA  GI/Hepatic/Renal/Endo    (+) obesity    Musculoskeletal     Abdominal    Substance History      OB/GYN          Other                      Anesthesia Plan    ASA 3     general   total IV anesthesia  intravenous induction     Anesthetic plan, risks, benefits, and alternatives have been provided, discussed and informed consent has been obtained with: patient.    Plan discussed with CRNA.    CODE STATUS:

## 2025-06-26 NOTE — DISCHARGE INSTR - ACTIVITY
BAHMAN DC Instructions  The medication, which was used to put you to sleep, will be acting in your body for the next twenty-four (24) hours, so you might feel a little sleepy; this feeling will slowly wear off.  Because the medicine is still in your system for the next twenty-four (24) hours:  Do not drive a car, operate machinery, or power tools  Do not drink any alcoholic drinks (not even beer)  Make any important decisions such as to sign important papers    We strongly suggest that a responsible adult be with the patient the rest of the day.    What to do for pain: acetaminophen (Tylenol) and eating cool, soothing foods (e.g. ice cream, smoothies) can help with a sore throat. If your pain is unmanageable with these methods, call the Cardiologist's office.     It may be better to start with liquids such as water, then soups, and graduate up to solid foods  If medication was used to numb your throat, do not eat or drink anything for 2 hours.     Call the cardiologist with any problems of:  Excessive mucus  Spitting up blood  Sore throat more than 72 hours    Go to the nearest Emergency Department if you're vomiting blood or having difficulty breathing.
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
## 2025-06-26 NOTE — ANESTHESIA POSTPROCEDURE EVALUATION
Patient: Tyson Rosario    Procedure Summary       Date: 06/26/25 Room / Location: [facility]    Anesthesia Start: 0904 Anesthesia Stop: 0915    Procedure: ADULT TRANSESOPHAGEAL ECHO (BAHMAN) W/ CONT IF NECESSARY PER PROTOCOL Diagnosis:       Cerebrovascular accident (CVA), unspecified mechanism      (Cardiac Source of Emboli)    Scheduled Providers: Suhas Rader MD Provider: Angie Tate MD    Anesthesia Type: general ASA Status: 3            Anesthesia Type: general    Vitals  Vitals Value Taken Time   /89 06/26/25 09:40   Temp     Pulse 60 06/26/25 09:58   Resp 16 06/26/25 09:15   SpO2 84 % 06/26/25 09:58   Vitals shown include unfiled device data.        Post Anesthesia Care and Evaluation    Patient location during evaluation: PACU  Patient participation: complete - patient participated  Level of consciousness: awake and alert  Pain management: satisfactory to patient    Airway patency: patent  Anesthetic complications: No anesthetic complications  PONV Status: none  Cardiovascular status: acceptable  Respiratory status: acceptable  Hydration status: acceptable

## (undated) DEVICE — CATH DIAG IMPULSE PIG .056 6F 110CM

## (undated) DEVICE — CATH DIAG IMPULSE FL4 6F 100CM

## (undated) DEVICE — PINNACLE INTRODUCER SHEATH: Brand: PINNACLE

## (undated) DEVICE — PK TRY HEART CATH 50

## (undated) DEVICE — CATH DIAG IMPULSE FR4 6F 100CM

## (undated) DEVICE — GW PTFE EMERALD HEPCOAT FC J TIP STD .035 3MM 150CM